# Patient Record
Sex: FEMALE | Race: BLACK OR AFRICAN AMERICAN | URBAN - METROPOLITAN AREA
[De-identification: names, ages, dates, MRNs, and addresses within clinical notes are randomized per-mention and may not be internally consistent; named-entity substitution may affect disease eponyms.]

---

## 2016-10-26 LAB
HBV SURFACE AG SERPL QL IA: NEGATIVE
HIV 1+2 AB+HIV1 P24 AG SERPL QL IA: NEGATIVE
RUBELLA ANTIBODY IGG QUANTITATIVE: NORMAL IU/ML
T PALLIDUM IGG SER QL: NORMAL

## 2017-05-03 LAB — GROUP B STREP PCR: NEGATIVE

## 2017-05-10 ENCOUNTER — HOSPITAL ENCOUNTER (OUTPATIENT)
Facility: CLINIC | Age: 27
Discharge: HOME | End: 2017-05-10
Attending: OBSTETRICS & GYNECOLOGY | Admitting: OBSTETRICS & GYNECOLOGY
Payer: COMMERCIAL

## 2017-05-10 VITALS
WEIGHT: 176 LBS | TEMPERATURE: 99 F | SYSTOLIC BLOOD PRESSURE: 114 MMHG | HEART RATE: 90 BPM | HEIGHT: 65 IN | DIASTOLIC BLOOD PRESSURE: 60 MMHG | RESPIRATION RATE: 18 BRPM | BODY MASS INDEX: 29.32 KG/M2

## 2017-05-10 PROCEDURE — 25000125 ZZHC RX 250: Performed by: OBSTETRICS & GYNECOLOGY

## 2017-05-10 PROCEDURE — 59025 FETAL NON-STRESS TEST: CPT

## 2017-05-10 PROCEDURE — 59412 ANTEPARTUM MANIPULATION: CPT

## 2017-05-10 PROCEDURE — 96372 THER/PROPH/DIAG INJ SC/IM: CPT

## 2017-05-10 RX ORDER — PRENATAL VIT/IRON FUM/FOLIC AC 27MG-0.8MG
1 TABLET ORAL DAILY
COMMUNITY

## 2017-05-10 RX ORDER — TERBUTALINE SULFATE 1 MG/ML
0.25 INJECTION, SOLUTION SUBCUTANEOUS ONCE
Status: DISCONTINUED | OUTPATIENT
Start: 2017-05-10 | End: 2017-05-10

## 2017-05-10 RX ORDER — LIDOCAINE 40 MG/G
CREAM TOPICAL
Status: DISCONTINUED | OUTPATIENT
Start: 2017-05-10 | End: 2017-05-10 | Stop reason: HOSPADM

## 2017-05-10 RX ORDER — TERBUTALINE SULFATE 1 MG/ML
0.25 INJECTION, SOLUTION SUBCUTANEOUS ONCE
Status: COMPLETED | OUTPATIENT
Start: 2017-05-10 | End: 2017-05-10

## 2017-05-10 RX ADMIN — TERBUTALINE SULFATE 0.25 MG: 1 INJECTION, SOLUTION SUBCUTANEOUS at 10:50

## 2017-05-10 NOTE — PROVIDER NOTIFICATION
05/10/17 1100   Provider Notification   Provider Name/Title Dr. Chen   Method of Notification At Bedside   Request Evaluate in Person   Dr. Chen at bedside. Bedside ultrasound demonstrates baby is in cephalic position. No attempt at external version necessary at this time.

## 2017-05-10 NOTE — PROVIDER NOTIFICATION
05/10/17 1042   Provider Notification   Provider Name/Title Dr. Chen   Method of Notification At Bedside   Request Evaluate in Person   Dr. Chen at bedside to evaluate patient and discuss plan of care. Patient and spouse questions answered and agreeable with plan, consent form signed.

## 2017-05-10 NOTE — DISCHARGE INSTRUCTIONS
Discharge Instruction for Undelivered Patients      You were seen for: Scheduled external version  We Consulted: Dr. Chen  You had (Test or Medicine): uterine and fetal monitoring, ultrasound, terbutaline     Diet:   Drink 8 to 12 glasses of liquids (milk, juice, water) every day.  You may eat meals and snacks.     Activity:  Call your doctor or nurse midwife if your baby is moving less than usual.     Call your provider if you notice:  Swelling in your face or increased swelling in your hands or legs.  Headaches that are not relieved by Tylenol (acetaminophen).  Changes in your vision (blurring: seeing spots or stars.)  Nausea (sick to your stomach) and vomiting (throwing up).   Weight gain of 5 pounds or more per week.  Heartburn that doesn't go away.  Signs of bladder infection: pain when you urinate (use the toilet), need to go more often and more urgently.  The bag of haskins (rupture of membranes) breaks, or you notice leaking in your underwear.  Bright red blood in your underwear.  Abdominal (lower belly) or stomach pain.  For first baby: Contractions (tightening) less than 5 minutes apart for one hour or more.  Increase or change in vaginal discharge (note the color and amount)    Follow-up:  As scheduled in the clinic

## 2017-05-10 NOTE — IP AVS SNAPSHOT
MRN:[id number]                      After Visit Summary   5/10/2017    Leonor Reynoso    MRN: [id number]           Thank you!     Thank you for choosing [facility] for your care. Our goal is always to provide you with excellent care. Hearing back from our patients is one way we can continue to improve our services. Please take a few minutes to complete the written survey that you may receive in the mail after you visit. If you would like to speak to someone directly about your visit please contact Patient Relations at [phone]. Thank you!          Patient Information     Date Of Birth          1990        About your hospital stay     You were admitted on:  May 10, 2017 You last received care in the:  [facility] Labor and Delivery    You were discharged on:  May 10, 2017       Who to Call     For medical emergencies, please call 911.  For non-urgent questions about your medical care, please call your primary care provider or clinic, [phone]          Attending Provider     Provider Specialty    Kun Kelly MD OB/Gyn    April, Neris Brady DO OB/Gyn       Primary Care Provider Office Phone # Fax #    [facility] [phone] [address] [place] [address]        After Care Instructions     Discharge Instructions - Post Extrenal Version Procedure           Discharge Instructions - Post Extrenal Version Procedure                 Further instructions from your care team       Discharge Instruction for Undelivered Patients      You were seen for: Scheduled external version  We Consulted: Dr. Chen  You had (Test or Medicine): uterine and fetal monitoring, ultrasound, terbutaline     Diet:   Drink 8 to 12 glasses of liquids (milk, juice, water) every day.  You may eat meals and snacks.     Activity:  Call your doctor or nurse midwife if your baby is moving less than usual.     Call your provider if you  "notice:  Swelling in your face or increased swelling in your hands or legs.  Headaches that are not relieved by Tylenol (acetaminophen).  Changes in your vision (blurring: seeing spots or stars.)  Nausea (sick to your stomach) and vomiting (throwing up).   Weight gain of 5 pounds or more per week.  Heartburn that doesn't go away.  Signs of bladder infection: pain when you urinate (use the toilet), need to go more often and more urgently.  The bag of haskins (rupture of membranes) breaks, or you notice leaking in your underwear.  Bright red blood in your underwear.  Abdominal (lower belly) or stomach pain.  For first baby: Contractions (tightening) less than 5 minutes apart for one hour or more.  Increase or change in vaginal discharge (note the color and amount)    Follow-up:  As scheduled in the clinic          Pending Results     No orders found from 2017 to 2017.            Admission Information     Date & Time Provider Department Dept. Phone    5/10/2017 eNris Chen,  [facility] Labor and Delivery [phone]      Your Vitals Were     Blood Pressure Pulse Temperature Respirations Height Weight    114/60 90 99  F (37.2  C) (Oral) 18 1.651 m (5' 5\") 79.8 kg (176 lb)    Last Period BMI (Body Mass Index)                2016 29.29 kg/m2          [employer] Better Information     [employer] lets you send messages to your doctor, view your test results, renew your prescriptions, schedule appointments and more. To sign up, go to [email or web address] . Click on \"Log in\" on the left side of the screen, which will take you to the Welcome page. Then click on \"Sign up Now\" on the right side of the page.     You will be asked to enter the access code listed below, as well as some personal information. Please follow the directions to create your username and password.     Your access code is: [email or web address]  Expires: 2017 11:19 AM     Your access code will  in 90 days. If you need help or a new code, " please call your [place] clinic or [phone].        Care EveryWhere ID     This is your Care EveryWhere ID. This could be used by other organizations to access your [place] medical records  [id number]           Review of your medicines      UNREVIEWED medicines. Ask your doctor about these medicines        Dose / Directions    prenatal multivitamin  plus iron 27-0.8 MG Tabs per tablet   Indication:  Pregnancy        Dose:  1 tablet   Take 1 tablet by mouth daily   Refills:  0                Protect others around you: Learn how to safely use, store and throw away your medicines at www.disposemymeds.org.             Medication List: This is a list of all your medications and when to take them. Check marks below indicate your daily home schedule. Keep this list as a reference.      Medications           Morning Afternoon Evening Bedtime As Needed    prenatal multivitamin  plus iron 27-0.8 MG Tabs per tablet   Take 1 tablet by mouth daily

## 2017-05-10 NOTE — H&P
"[facility] OB H&P    Leonor Reynoso  May 10, 2017    This is a 26 year old  at 37 0/7 who presents for ECV. Prenatal care at [facility] complicated by breech position, recent polyhydramnios with weekly BPP/NST's, resolved placental previa with shrinking 7 cm subchorionic hemorrhage on  US 3/2017 with recent growth OB US 2017 only mentioning posterior placenta.  Pt was seen in the office 2017 and management options reviewed including ECV or PLTCS.  After reviewing R/B/I/A, pt is interested in ECV and accepts the risks.      PAST MEDICAL HISTORY: History reviewed. No pertinent past medical history.    PAST SURGICAL HISTORY: History reviewed. No pertinent surgical history.    FAMILY HISTORY: No family history on file.    SOCIAL HISTORY:   Social History   Substance Use Topics     Smoking status: Never Smoker     Smokeless tobacco: Not on file     Alcohol use No       Physical Exam:  Vitals: /64  Pulse 90  Temp 99  F (37.2  C) (Oral)  Resp 18  Ht 1.651 m (5' 5\")  Wt 79.8 kg (176 lb)  LMP 2016  BMI 29.29 kg/m2  BMI= Body mass index is 29.29 kg/(m^2).  TOCO: rare  SVE: not done  FHT'S:130's, moderate variability, reactive prior to procedure    Assessment and Plan: IUP 37 0/7, Breech (now vertex), polyhydramnios, posterior placenta, Rh+    BSUS performed showing VERTEX position    1.  BPP less than 12 hours ago showed baby in breech position, now vertex.  This discussed with pt/  that baby now vertex.  Discussed that we would continue with her weekly BPP/ NST's for polyhydramnios and monitor fetal position.  Discussed possibility of flipping back to breech with the polyhydramnios but we would continue to monitor.  NST reactive, category 1.  2.  Keep next scheduled clinic visit.  Return precautions reviewed.    Neris Chen   "

## 2017-05-10 NOTE — IP AVS SNAPSHOT
[facility] Labor and Delivery    [address]    [facility] [address]    Phone:  [phone]    Fax:  [phone]                                       After Visit Summary   5/10/2017    Leonor Reynoso    MRN: [id number]           After Visit Summary Signature Page     I have received my discharge instructions, and my questions have been answered. I have discussed any challenges I see with this plan with the nurse or doctor.    ..........................................................................................................................................  Patient/Patient Representative Signature      ..........................................................................................................................................  Patient Representative Print Name and Relationship to Patient    ..................................................               ................................................  Date                                            Time    ..........................................................................................................................................  Reviewed by Signature/Title    ...................................................              ..............................................  Date                                                            Time

## 2017-05-10 NOTE — PLAN OF CARE
26 year old  at 37 weeks gestation presents to MAC #2 for external cephalic version. Patient reports good fetal movement, denies leaking of fluid, vaginal bleeding, and contractions. EFM and toco explained and applied. Health history obtained, physical assessment completed. Dr. Chen on unit and aware of patient arrival, FHTs and contraction pattern, orders received. PIV placed per orders and terbutaline administered. Will continue to monitor.

## 2017-05-10 NOTE — PLAN OF CARE
Data: Patient presented to [facility] for scheduled external version.  Cervical exam deferred.  Membranes intact.  Contractions occasionally per toco, patient reports not feeling contractions.  Action:  Presumed adequate fetal oxygenation documented (see flow record). Discharge instructions reviewed.  Patient instructed to report change in fetal movement, vaginal leaking of fluid or bleeding, abdominal pain, or any concerns related to the pregnancy to her nurse/physician.   Response: Orders to discharge home per Dr. Chen.  Patient verbalized understanding of education and verbalized agreement with plan.

## 2017-06-07 ENCOUNTER — HOSPITAL ENCOUNTER (INPATIENT)
Facility: CLINIC | Age: 27
LOS: 4 days | Discharge: HOME | End: 2017-06-11
Attending: OBSTETRICS & GYNECOLOGY | Admitting: OBSTETRICS & GYNECOLOGY
Payer: COMMERCIAL

## 2017-06-07 DIAGNOSIS — Z98.891 S/P CESAREAN SECTION: Primary | ICD-10-CM

## 2017-06-07 LAB
ABO + RH BLD: NORMAL
ABO + RH BLD: NORMAL
BLD GP AB SCN SERPL QL: NORMAL
BLOOD BANK CMNT PATIENT-IMP: NORMAL
HGB BLD-MCNC: 12.7 G/DL (ref 11.7–15.7)
SPECIMEN EXP DATE BLD: NORMAL

## 2017-06-07 PROCEDURE — 86850 RBC ANTIBODY SCREEN: CPT | Performed by: OBSTETRICS & GYNECOLOGY

## 2017-06-07 PROCEDURE — 85018 HEMOGLOBIN: CPT | Performed by: OBSTETRICS & GYNECOLOGY

## 2017-06-07 PROCEDURE — 3E0P7GC INTRODUCTION OF OTHER THERAPEUTIC SUBSTANCE INTO FEMALE REPRODUCTIVE, VIA NATURAL OR ARTIFICIAL OPENING: ICD-10-PCS | Performed by: OBSTETRICS & GYNECOLOGY

## 2017-06-07 PROCEDURE — 86901 BLOOD TYPING SEROLOGIC RH(D): CPT | Performed by: OBSTETRICS & GYNECOLOGY

## 2017-06-07 PROCEDURE — 25000132 ZZH RX MED GY IP 250 OP 250 PS 637: Performed by: OBSTETRICS & GYNECOLOGY

## 2017-06-07 PROCEDURE — 12000031 ZZH R&B OB CRITICAL

## 2017-06-07 PROCEDURE — 86900 BLOOD TYPING SEROLOGIC ABO: CPT | Performed by: OBSTETRICS & GYNECOLOGY

## 2017-06-07 PROCEDURE — 86780 TREPONEMA PALLIDUM: CPT | Performed by: OBSTETRICS & GYNECOLOGY

## 2017-06-07 RX ADMIN — DINOPROSTONE 10 MG: 10 INSERT VAGINAL at 20:51

## 2017-06-07 NOTE — IP AVS SNAPSHOT
MRN:[id number]                      After Visit Summary   2017    Leonor Reynoso    MRN: [id number]           Thank you!     Thank you for choosing [facility] for your care. Our goal is always to provide you with excellent care. Hearing back from our patients is one way we can continue to improve our services. Please take a few minutes to complete the written survey that you may receive in the mail after you visit. If you would like to speak to someone directly about your visit please contact Patient Relations at [phone]. Thank you!          Patient Information     Date Of Birth          1990        Designated Caregiver       Most Recent Value    Caregiver    Will someone help with your care after discharge? no      About your hospital stay     You were admitted on:  2017 You last received care in the:  [facility] Postpartum    You were discharged on:  2017       Who to Call     For medical emergencies, please call 911.  For non-urgent questions about your medical care, please call your primary care provider or clinic, [phone]  For questions related to your surgery, please call your surgery clinic        Attending Provider     Provider Specialty    Jael Reeves MD OB/Gyn    Dane Engel MD OB/Gyn       Primary Care Provider Office Phone # Fax #    [facility] [phone]      After Care Instructions     Activity       Review discharge instructions            Diet       Resume previous diet            Discharge Instructions - Postpartum visit       Schedule postpartum visit with your provider and return to clinic in 6 weeks.                  Further instructions from your care team         Discharge Instructions for  Section ()  Lactation Phone # [phone]  You had a  section, or . During the , your baby was delivered through a surgical incision  in your stomach and uterus. Full recovery after a  can take time. It s important to take care of yourself -- for your own sake and because your new baby needs you. Here are some guidelines to follow at home.  Incision care  Here's how to take care of your incision:    Shower as needed. Pat your incision dry.    Watch your incision for signs of infection, like increasing redness or drainage.    Hold a pillow against the incision when you laugh or cough and when you get up from a lying or sitting position.    Remember, it can take as long as 6 weeks for a  incision to heal.  Activity  Here are some suggestions:    Don t try to take care of anyone other than your baby and yourself.    Remember, the more active you are, the more likely you are to have an increase in your bleeding.    Get lots of rest. Take naps in the afternoon.    Increase your activities gradually.    Plan your activities so that you don t have to go up or down stairs more than necessary.    Do postsurgical deep breathing and coughing exercises. Ask your healthcare provider for instructions.    Don t lift anything heavier than your baby until your healthcare provider tells you it s OK.    Don t drive until your healthcare provider says it s OK.    Don t have sexual intercourse until after you ve had a follow-up appointment with your healthcare provider and you have decided on a birth control method.  Follow-up  Make a follow-up appointment as directed by our staff.  When to call your healthcare provider  Call your healthcare provider right away if you have any of the following:    Fever of 100.4 F (38 C) or higher    Redness, pain, or drainage at your incision site    Bleeding that requires a new sanitary pad every hour    Severe pain in the abdomen    Pain or urgency with urination    Foul odor from vaginal discharge    Trouble urinating or emptying your bladder    No bowel movement within 1 week after the birth of your  "baby    Swollen, red, painful area in the leg    Appearance of rash or hives    Sore, red, painful area on the breasts that may be accompanied by flu-like symptoms    Feelings of anxiety, panic, and/or depression     5986-8961 [employer]. [address] [place] [address]. All rights reserved. This information is not intended as a substitute for professional medical care. Always follow your healthcare professional's instructions.          Pending Results     No orders found from 2017 to 2017.            Statement of Approval     Ordered          17 1109  I have reviewed and agree with all the recommendations and orders detailed in this document.  EFFECTIVE NOW     Approved and electronically signed by:  Kun Kelly MD             Admission Information     Date & Time Provider Department Dept. Phone    2017 Dane Engel MD [place] [phone]      Your Vitals Were     Blood Pressure Temperature Respirations Weight Last Period Pulse Oximetry    124/70 99.1  F (37.3  C) (Oral) 16 86.2 kg (190 lb) 2016 98%    BMI (Body Mass Index)                   31.62 kg/m2           MyChart Information     [employer] lets you send messages to your doctor, view your test results, renew your prescriptions, schedule appointments and more. To sign up, go to [email or web address] . Click on \"Log in\" on the left side of the screen, which will take you to the Welcome page. Then click on \"Sign up Now\" on the right side of the page.     You will be asked to enter the access code listed below, as well as some personal information. Please follow the directions to create your username and password.     Your access code is: [email or web address]  Expires: 2017 11:19 AM     Your access code will  in 90 days. If you need help or a new code, please call your [facility] or [phone].        Care EveryWhere ID     This is your Care EveryWhere ID. This " could be used by other organizations to access your [facility] medical records  [id number]           Review of your medicines      START taking        Dose / Directions    acetaminophen 325 MG tablet   Commonly known as:  TYLENOL        Dose:  650 mg   Take 2 tablets (650 mg) by mouth every 4 hours as needed for mild pain   Quantity:  100 tablet   Refills:  0       breast pump Misc        Dose:  1 each   1 each as needed   Quantity:  1 each   Refills:  0       ibuprofen 600 MG tablet   Commonly known as:  ADVIL/MOTRIN        Dose:  600 mg   Take 1 tablet (600 mg) by mouth every 6 hours as needed for moderate pain   Quantity:  60 tablet   Refills:  0       oxyCODONE 5 MG IR tablet   Commonly known as:  ROXICODONE        Dose:  5 mg   Take 1 tablet (5 mg) by mouth every 4 hours as needed for pain maximum 8 tablet(s) per day   Quantity:  20 tablet   Refills:  0         CONTINUE these medicines which have NOT CHANGED        Dose / Directions    prenatal multivitamin  plus iron 27-0.8 MG Tabs per tablet   Indication:  Pregnancy        Dose:  1 tablet   Take 1 tablet by mouth daily   Refills:  0            Where to get your medicines      Some of these will need a paper prescription and others can be bought over the counter. Ask your nurse if you have questions.     Bring a paper prescription for each of these medications     acetaminophen 325 MG tablet    breast pump Misc    ibuprofen 600 MG tablet    oxyCODONE 5 MG IR tablet                Protect others around you: Learn how to safely use, store and throw away your medicines at www.disposemymeds.org.             Medication List: This is a list of all your medications and when to take them. Check marks below indicate your daily home schedule. Keep this list as a reference.      Medications           Morning Afternoon Evening Bedtime As Needed    acetaminophen 325 MG tablet   Commonly known as:  TYLENOL   Take 2 tablets (650 mg) by mouth every 4 hours as needed for mild  pain   Last time this was given:  975 mg on 6/11/2017  5:46 AM                                breast pump Misc   1 each as needed                                ibuprofen 600 MG tablet   Commonly known as:  ADVIL/MOTRIN   Take 1 tablet (600 mg) by mouth every 6 hours as needed for moderate pain   Last time this was given:  800 mg on 6/10/2017 10:59 PM                                oxyCODONE 5 MG IR tablet   Commonly known as:  ROXICODONE   Take 1 tablet (5 mg) by mouth every 4 hours as needed for pain maximum 8 tablet(s) per day   Last time this was given:  5 mg on 6/10/2017  7:15 PM                                prenatal multivitamin  plus iron 27-0.8 MG Tabs per tablet   Take 1 tablet by mouth daily   Last time this was given:  1 tablet on 6/11/2017  9:38 AM

## 2017-06-07 NOTE — LETTER
[facility] Postpartum Home Care Referral  [facility] POSTPARTUM  [address] [place] [address]  Phone: [phone]  Fax: [phone]    Date of Referral: 2017    Leonor Cuenca MRN# [id number]   Age: 26 year old YOB: 1990           Date of Admission:  2017  7:32 PM    Primary care provider: [facility], [place]  Attending Provider: Dane Engel MD    Payor: BCBS / Plan: [place] [place] / Product Type: Indemnity /          Pregnancy History:   The details of the mother's pregnancy are as follows:  OBSTETRIC HISTORY:  @[age@  EDC: Estimated Date of Delivery: 17  Obstetric History       T1      L1     SAB0   TAB0   Ectopic0   Multiple0   Live Births1       # Outcome Date GA Lbr Lobito/2nd Weight Sex Delivery Anes PTL Lv   2 Term 17 41w1d  4.44 kg (9 lb 12.6 oz) M CS-LTranv EPI N STEPHEN      Name: ERVIN CUENCA      Complications: Fetal Intolerance,Failure to Progress in First Stage      Apgar1:  8                Apgar5: 9   1 SAB 2016     SAB             Prenatal Labs:   Lab Results   Component Value Date    ABO O 2017    RH  Pos 2017    AS Neg 2017    HEPBANG negative 10/26/2016    CHPCRT  05/10/2016     Negative   Negative for C. trachomatis rRNA by transcription mediated amplification.   A negative result by transcription mediated amplification does not preclude the   presence of C. trachomatis infection because results are dependent on proper   and adequate collection, absence of inhibitors, and sufficient rRNA to be   detected.      GCPCRT  05/10/2016     Negative   Negative for N. gonorrhoeae rRNA by transcription mediated amplification.   A negative result by transcription mediated amplification does not preclude the   presence of N. gonorrhoeae infection because results are dependent on proper   and adequate collection, absence of inhibitors, and sufficient rRNA to be   detected.       "TREPAB Negative 2017    HGB 11.0 (L) 2017       GBS Status:  Lab Results   Component Value Date    GBS negative 2017              Maternal History:   { :214490716}                      Family History:   { :993271837}          Social History:   { :8147177697}       Birth  History:      Birth Information  Information for the patient's :  Donna Reynoso Eman [[id number]]     Birth History     Birth     Length: 0.546 m (1' 9.5\")     Weight: 4.44 kg (9 lb 12.6 oz)     HC 36.8 cm     Apgar     One: 8     Five: 9     Delivery Method: , Low Transverse     Gestation Age: 41 1/7 wks       Information for the patient's :  Donna Reynoso Eman [[id number]]     Immunization History   Administered Date(s) Administered     Hepatitis B 2017             Information     Feeding plan:   Information for the patient's :  Donna Reynoso Eman [[id number]]           Latch:   Information for the patient's :  Donna Reynoso Eman [[id number]]   7      Information for the patient's :  Donna Reynoso Eman [[id number]]   Vitals  Pulse: 152  Heart Sounds: no murmur detected  Cardiac Regularity: Regular  Resp: 44  Temp: 98.5  F (36.9  C)  Temp src: Axillary      Information for the patient's :  Donna Reynoso Eman [[id number]]         Information for the patient's :  Donna Reynoso Eman [[id number]]   Weight: 4.196 kg (9 lb 4 oz)     Information for the patient's :  Donna Reynoso Eman [[id number]]   Percent Weight Change Since Birth: -5.5       Information for the patient's :  Donna Reynoso Eman [[id number]]   Hearing Screen Date: 06/10/17    Information for the patient's :  Donna Reynoso Eman [[id number]]          Bilirubin Results:     Information for the patient's :  Donna Reynoso Eman [[id number]]     Recent Labs   Lab Test  17   1445   TCBIL  2.0            Discharge Meds:      " Leonor Reynoso   Home Medication Instructions VALERIE:[id number]    Printed on:17 1121   Medication Information                      acetaminophen (TYLENOL) 325 MG tablet  Take 2 tablets (650 mg) by mouth every 4 hours as needed for mild pain             ibuprofen (ADVIL/MOTRIN) 600 MG tablet  Take 1 tablet (600 mg) by mouth every 6 hours as needed for moderate pain             Misc. Devices (BREAST PUMP) MISC  1 each as needed             oxyCODONE (ROXICODONE) 5 MG IR tablet  Take 1 tablet (5 mg) by mouth every 4 hours as needed for pain maximum 8 tablet(s) per day             Prenatal Vit-Fe Fumarate-FA (PRENATAL MULTIVITAMIN  PLUS IRON) 27-0.8 MG TABS per tablet  Take 1 tablet by mouth daily                 Information for the patient's :  Donna Reynoso [[id number]]     There are no discharge medications for this patient.           Summary of Plan of Care:     Home Care to draw [place] Screen? {YES LAB SLIP SENT HOME; NO:[id number]}    Home Care Agency referred to: ***    ***      Ada Smith RN

## 2017-06-07 NOTE — IP AVS SNAPSHOT
[facility]    [address] [facility] [address]    Phone:  [phone]    Fax:  [phone]                                       After Visit Summary   6/7/2017    Leonor Reynoso    MRN: [id number]           After Visit Summary Signature Page     I have received my discharge instructions, and my questions have been answered. I have discussed any challenges I see with this plan with the nurse or doctor.    ..........................................................................................................................................  Patient/Patient Representative Signature      ..........................................................................................................................................  Patient Representative Print Name and Relationship to Patient    ..................................................               ................................................  Date                                            Time    ..........................................................................................................................................  Reviewed by Signature/Title    ...................................................              ..............................................  Date                                                            Time

## 2017-06-08 ENCOUNTER — ANESTHESIA (OUTPATIENT)
Dept: OBGYN | Facility: CLINIC | Age: 27
End: 2017-06-08
Payer: COMMERCIAL

## 2017-06-08 ENCOUNTER — ANESTHESIA EVENT (OUTPATIENT)
Dept: OBGYN | Facility: CLINIC | Age: 27
End: 2017-06-08
Payer: COMMERCIAL

## 2017-06-08 ENCOUNTER — SURGERY (OUTPATIENT)
Age: 27
End: 2017-06-08

## 2017-06-08 PROBLEM — Z98.891 S/P CESAREAN SECTION: Status: ACTIVE | Noted: 2017-06-08

## 2017-06-08 LAB — T PALLIDUM IGG+IGM SER QL: NEGATIVE

## 2017-06-08 PROCEDURE — 40000671 ZZH STATISTIC ANESTHESIA CASE

## 2017-06-08 PROCEDURE — 36000058 ZZH SURGERY LEVEL 3 EA 15 ADDTL MIN: Performed by: OBSTETRICS & GYNECOLOGY

## 2017-06-08 PROCEDURE — 27210995 ZZH RX 272: Performed by: OBSTETRICS & GYNECOLOGY

## 2017-06-08 PROCEDURE — 37000011 ZZH ANESTHESIA WARD SERVICE

## 2017-06-08 PROCEDURE — 25000132 ZZH RX MED GY IP 250 OP 250 PS 637

## 2017-06-08 PROCEDURE — 25000128 H RX IP 250 OP 636

## 2017-06-08 PROCEDURE — 71000012 ZZH RECOVERY PHASE 1 LEVEL 1 FIRST HR: Performed by: OBSTETRICS & GYNECOLOGY

## 2017-06-08 PROCEDURE — 25000125 ZZHC RX 250

## 2017-06-08 PROCEDURE — 71000013 ZZH RECOVERY PHASE 1 LEVEL 1 EA ADDTL HR: Performed by: OBSTETRICS & GYNECOLOGY

## 2017-06-08 PROCEDURE — 10907ZC DRAINAGE OF AMNIOTIC FLUID, THERAPEUTIC FROM PRODUCTS OF CONCEPTION, VIA NATURAL OR ARTIFICIAL OPENING: ICD-10-PCS | Performed by: OBSTETRICS & GYNECOLOGY

## 2017-06-08 PROCEDURE — 27210794 ZZH OR GENERAL SUPPLY STERILE: Performed by: OBSTETRICS & GYNECOLOGY

## 2017-06-08 PROCEDURE — 25000132 ZZH RX MED GY IP 250 OP 250 PS 637: Performed by: OBSTETRICS & GYNECOLOGY

## 2017-06-08 PROCEDURE — 25000125 ZZHC RX 250: Performed by: ANESTHESIOLOGY

## 2017-06-08 PROCEDURE — 36000056 ZZH SURGERY LEVEL 3 1ST 30 MIN: Performed by: OBSTETRICS & GYNECOLOGY

## 2017-06-08 PROCEDURE — 37000009 ZZH ANESTHESIA TECHNICAL FEE, EACH ADDTL 15 MIN: Performed by: OBSTETRICS & GYNECOLOGY

## 2017-06-08 PROCEDURE — 25000125 ZZHC RX 250: Performed by: OBSTETRICS & GYNECOLOGY

## 2017-06-08 PROCEDURE — 12000029 ZZH R&B OB INTERMEDIATE

## 2017-06-08 PROCEDURE — 3E0R3CZ INTRODUCTION OF REGIONAL ANESTHETIC INTO SPINAL CANAL, PERCUTANEOUS APPROACH: ICD-10-PCS | Performed by: ANESTHESIOLOGY

## 2017-06-08 PROCEDURE — 25000128 H RX IP 250 OP 636: Performed by: OBSTETRICS & GYNECOLOGY

## 2017-06-08 PROCEDURE — 00HU33Z INSERTION OF INFUSION DEVICE INTO SPINAL CANAL, PERCUTANEOUS APPROACH: ICD-10-PCS | Performed by: ANESTHESIOLOGY

## 2017-06-08 PROCEDURE — 25000128 H RX IP 250 OP 636: Performed by: NURSE ANESTHETIST, CERTIFIED REGISTERED

## 2017-06-08 PROCEDURE — 40000275 ZZH STATISTIC RCP TIME EA 10 MIN

## 2017-06-08 PROCEDURE — 37000008 ZZH ANESTHESIA TECHNICAL FEE, 1ST 30 MIN: Performed by: OBSTETRICS & GYNECOLOGY

## 2017-06-08 PROCEDURE — 25000128 H RX IP 250 OP 636: Performed by: ANESTHESIOLOGY

## 2017-06-08 PROCEDURE — 25000125 ZZHC RX 250: Performed by: NURSE ANESTHETIST, CERTIFIED REGISTERED

## 2017-06-08 PROCEDURE — 40000977 ZZH STATISTIC ATTENDANCE AT DELIVERY

## 2017-06-08 RX ORDER — MORPHINE SULFATE 1 MG/ML
INJECTION, SOLUTION EPIDURAL; INTRATHECAL; INTRAVENOUS PRN
Status: DISCONTINUED | OUTPATIENT
Start: 2017-06-08 | End: 2017-06-08

## 2017-06-08 RX ORDER — OXYTOCIN 10 [USP'U]/ML
10 INJECTION, SOLUTION INTRAMUSCULAR; INTRAVENOUS
Status: DISCONTINUED | OUTPATIENT
Start: 2017-06-08 | End: 2017-06-11 | Stop reason: HOSPADM

## 2017-06-08 RX ORDER — DEXTROSE, SODIUM CHLORIDE, SODIUM LACTATE, POTASSIUM CHLORIDE, AND CALCIUM CHLORIDE 5; .6; .31; .03; .02 G/100ML; G/100ML; G/100ML; G/100ML; G/100ML
INJECTION, SOLUTION INTRAVENOUS CONTINUOUS
Status: DISCONTINUED | OUTPATIENT
Start: 2017-06-08 | End: 2017-06-11 | Stop reason: HOSPADM

## 2017-06-08 RX ORDER — EPHEDRINE SULFATE 50 MG/ML
5 INJECTION, SOLUTION INTRAMUSCULAR; INTRAVENOUS; SUBCUTANEOUS
Status: DISCONTINUED | OUTPATIENT
Start: 2017-06-08 | End: 2017-06-08

## 2017-06-08 RX ORDER — LIDOCAINE HCL/EPINEPHRINE/PF 2%-1:200K
VIAL (ML) INJECTION PRN
Status: DISCONTINUED | OUTPATIENT
Start: 2017-06-08 | End: 2017-06-08

## 2017-06-08 RX ORDER — ONDANSETRON 2 MG/ML
4 INJECTION INTRAMUSCULAR; INTRAVENOUS EVERY 6 HOURS PRN
Status: DISCONTINUED | OUTPATIENT
Start: 2017-06-08 | End: 2017-06-11 | Stop reason: HOSPADM

## 2017-06-08 RX ORDER — LIDOCAINE 40 MG/G
CREAM TOPICAL
Status: DISCONTINUED | OUTPATIENT
Start: 2017-06-08 | End: 2017-06-11 | Stop reason: HOSPADM

## 2017-06-08 RX ORDER — SIMETHICONE 80 MG
80 TABLET,CHEWABLE ORAL 4 TIMES DAILY PRN
Status: DISCONTINUED | OUTPATIENT
Start: 2017-06-08 | End: 2017-06-11 | Stop reason: HOSPADM

## 2017-06-08 RX ORDER — DEXAMETHASONE SODIUM PHOSPHATE 4 MG/ML
INJECTION, SOLUTION INTRA-ARTICULAR; INTRALESIONAL; INTRAMUSCULAR; INTRAVENOUS; SOFT TISSUE PRN
Status: DISCONTINUED | OUTPATIENT
Start: 2017-06-08 | End: 2017-06-08

## 2017-06-08 RX ORDER — OXYTOCIN/0.9 % SODIUM CHLORIDE 30/500 ML
100 PLASTIC BAG, INJECTION (ML) INTRAVENOUS CONTINUOUS
Status: DISCONTINUED | OUTPATIENT
Start: 2017-06-08 | End: 2017-06-11 | Stop reason: HOSPADM

## 2017-06-08 RX ORDER — PROCHLORPERAZINE 25 MG
25 SUPPOSITORY, RECTAL RECTAL EVERY 12 HOURS PRN
Status: DISCONTINUED | OUTPATIENT
Start: 2017-06-08 | End: 2017-06-11 | Stop reason: HOSPADM

## 2017-06-08 RX ORDER — SODIUM CHLORIDE, SODIUM LACTATE, POTASSIUM CHLORIDE, CALCIUM CHLORIDE 600; 310; 30; 20 MG/100ML; MG/100ML; MG/100ML; MG/100ML
INJECTION, SOLUTION INTRAVENOUS CONTINUOUS
Status: DISCONTINUED | OUTPATIENT
Start: 2017-06-08 | End: 2017-06-08

## 2017-06-08 RX ORDER — ONDANSETRON 4 MG/1
4 TABLET, ORALLY DISINTEGRATING ORAL EVERY 6 HOURS PRN
Status: DISCONTINUED | OUTPATIENT
Start: 2017-06-08 | End: 2017-06-08

## 2017-06-08 RX ORDER — ONDANSETRON 2 MG/ML
4 INJECTION INTRAMUSCULAR; INTRAVENOUS EVERY 6 HOURS PRN
Status: DISCONTINUED | OUTPATIENT
Start: 2017-06-08 | End: 2017-06-08

## 2017-06-08 RX ORDER — CARBOPROST TROMETHAMINE 250 UG/ML
250 INJECTION, SOLUTION INTRAMUSCULAR
Status: DISCONTINUED | OUTPATIENT
Start: 2017-06-08 | End: 2017-06-11 | Stop reason: HOSPADM

## 2017-06-08 RX ORDER — EPHEDRINE SULFATE 50 MG/ML
INJECTION, SOLUTION INTRAMUSCULAR; INTRAVENOUS; SUBCUTANEOUS
Status: COMPLETED
Start: 2017-06-08 | End: 2017-06-08

## 2017-06-08 RX ORDER — ACETAMINOPHEN 325 MG/1
650 TABLET ORAL EVERY 4 HOURS PRN
Status: DISCONTINUED | OUTPATIENT
Start: 2017-06-11 | End: 2017-06-11 | Stop reason: HOSPADM

## 2017-06-08 RX ORDER — NALOXONE HYDROCHLORIDE 0.4 MG/ML
.1-.4 INJECTION, SOLUTION INTRAMUSCULAR; INTRAVENOUS; SUBCUTANEOUS
Status: DISCONTINUED | OUTPATIENT
Start: 2017-06-08 | End: 2017-06-08

## 2017-06-08 RX ORDER — BISACODYL 10 MG
10 SUPPOSITORY, RECTAL RECTAL DAILY PRN
Status: DISCONTINUED | OUTPATIENT
Start: 2017-06-10 | End: 2017-06-11 | Stop reason: HOSPADM

## 2017-06-08 RX ORDER — CEFAZOLIN SODIUM 2 G/100ML
2 INJECTION, SOLUTION INTRAVENOUS
Status: COMPLETED | OUTPATIENT
Start: 2017-06-08 | End: 2017-06-08

## 2017-06-08 RX ORDER — METOCLOPRAMIDE HYDROCHLORIDE 5 MG/ML
10 INJECTION INTRAMUSCULAR; INTRAVENOUS EVERY 6 HOURS PRN
Status: DISCONTINUED | OUTPATIENT
Start: 2017-06-08 | End: 2017-06-11 | Stop reason: HOSPADM

## 2017-06-08 RX ORDER — LANOLIN 100 %
OINTMENT (GRAM) TOPICAL
Status: DISCONTINUED | OUTPATIENT
Start: 2017-06-08 | End: 2017-06-11 | Stop reason: HOSPADM

## 2017-06-08 RX ORDER — METHYLERGONOVINE MALEATE 0.2 MG/ML
200 INJECTION INTRAVENOUS
Status: DISCONTINUED | OUTPATIENT
Start: 2017-06-08 | End: 2017-06-08

## 2017-06-08 RX ORDER — PRENATAL VIT/IRON FUM/FOLIC AC 27MG-0.8MG
1 TABLET ORAL DAILY
Status: DISCONTINUED | OUTPATIENT
Start: 2017-06-08 | End: 2017-06-11 | Stop reason: HOSPADM

## 2017-06-08 RX ORDER — NALBUPHINE HYDROCHLORIDE 10 MG/ML
2.5-5 INJECTION, SOLUTION INTRAMUSCULAR; INTRAVENOUS; SUBCUTANEOUS EVERY 6 HOURS PRN
Status: DISCONTINUED | OUTPATIENT
Start: 2017-06-08 | End: 2017-06-08

## 2017-06-08 RX ORDER — HYDROMORPHONE HYDROCHLORIDE 1 MG/ML
.3-.5 INJECTION, SOLUTION INTRAMUSCULAR; INTRAVENOUS; SUBCUTANEOUS EVERY 30 MIN PRN
Status: DISCONTINUED | OUTPATIENT
Start: 2017-06-08 | End: 2017-06-11 | Stop reason: HOSPADM

## 2017-06-08 RX ORDER — OXYTOCIN/0.9 % SODIUM CHLORIDE 30/500 ML
340 PLASTIC BAG, INJECTION (ML) INTRAVENOUS CONTINUOUS PRN
Status: DISCONTINUED | OUTPATIENT
Start: 2017-06-08 | End: 2017-06-11 | Stop reason: HOSPADM

## 2017-06-08 RX ORDER — ACETAMINOPHEN 325 MG/1
650 TABLET ORAL EVERY 4 HOURS PRN
Status: DISCONTINUED | OUTPATIENT
Start: 2017-06-08 | End: 2017-06-08

## 2017-06-08 RX ORDER — DIPHENHYDRAMINE HCL 25 MG
25 CAPSULE ORAL EVERY 6 HOURS PRN
Status: DISCONTINUED | OUTPATIENT
Start: 2017-06-08 | End: 2017-06-11 | Stop reason: HOSPADM

## 2017-06-08 RX ORDER — CARBOPROST TROMETHAMINE 250 UG/ML
250 INJECTION, SOLUTION INTRAMUSCULAR
Status: DISCONTINUED | OUTPATIENT
Start: 2017-06-08 | End: 2017-06-08

## 2017-06-08 RX ORDER — METHYLERGONOVINE MALEATE 0.2 MG/ML
200 INJECTION INTRAVENOUS
Status: DISCONTINUED | OUTPATIENT
Start: 2017-06-08 | End: 2017-06-11 | Stop reason: HOSPADM

## 2017-06-08 RX ORDER — OXYTOCIN/0.9 % SODIUM CHLORIDE 30/500 ML
PLASTIC BAG, INJECTION (ML) INTRAVENOUS PRN
Status: DISCONTINUED | OUTPATIENT
Start: 2017-06-08 | End: 2017-06-08

## 2017-06-08 RX ORDER — DIPHENHYDRAMINE HYDROCHLORIDE 50 MG/ML
25 INJECTION INTRAMUSCULAR; INTRAVENOUS EVERY 6 HOURS PRN
Status: DISCONTINUED | OUTPATIENT
Start: 2017-06-08 | End: 2017-06-11 | Stop reason: HOSPADM

## 2017-06-08 RX ORDER — OXYCODONE AND ACETAMINOPHEN 5; 325 MG/1; MG/1
1 TABLET ORAL
Status: DISCONTINUED | OUTPATIENT
Start: 2017-06-08 | End: 2017-06-08

## 2017-06-08 RX ORDER — ZOLPIDEM TARTRATE 5 MG/1
5 TABLET ORAL
Status: DISCONTINUED | OUTPATIENT
Start: 2017-06-08 | End: 2017-06-08

## 2017-06-08 RX ORDER — FENTANYL CITRATE 50 UG/ML
100 INJECTION, SOLUTION INTRAMUSCULAR; INTRAVENOUS
Status: DISCONTINUED | OUTPATIENT
Start: 2017-06-08 | End: 2017-06-08

## 2017-06-08 RX ORDER — FENTANYL CITRATE 50 UG/ML
INJECTION, SOLUTION INTRAMUSCULAR; INTRAVENOUS PRN
Status: DISCONTINUED | OUTPATIENT
Start: 2017-06-08 | End: 2017-06-08

## 2017-06-08 RX ORDER — OXYCODONE HYDROCHLORIDE 5 MG/1
5-10 TABLET ORAL
Status: DISCONTINUED | OUTPATIENT
Start: 2017-06-08 | End: 2017-06-11 | Stop reason: HOSPADM

## 2017-06-08 RX ORDER — CEFAZOLIN SODIUM 1 G/3ML
1 INJECTION, POWDER, FOR SOLUTION INTRAMUSCULAR; INTRAVENOUS
Status: DISCONTINUED | OUTPATIENT
Start: 2017-06-08 | End: 2017-06-08

## 2017-06-08 RX ORDER — HYDROCORTISONE 2.5 %
CREAM (GRAM) TOPICAL 3 TIMES DAILY PRN
Status: DISCONTINUED | OUTPATIENT
Start: 2017-06-08 | End: 2017-06-11 | Stop reason: HOSPADM

## 2017-06-08 RX ORDER — MISOPROSTOL 100 UG/1
25 TABLET ORAL
Status: DISCONTINUED | OUTPATIENT
Start: 2017-06-08 | End: 2017-06-08

## 2017-06-08 RX ORDER — AMOXICILLIN 250 MG
1-2 CAPSULE ORAL 2 TIMES DAILY
Status: DISCONTINUED | OUTPATIENT
Start: 2017-06-08 | End: 2017-06-11 | Stop reason: HOSPADM

## 2017-06-08 RX ORDER — OXYTOCIN 10 [USP'U]/ML
10 INJECTION, SOLUTION INTRAMUSCULAR; INTRAVENOUS
Status: DISCONTINUED | OUTPATIENT
Start: 2017-06-08 | End: 2017-06-08

## 2017-06-08 RX ORDER — ACETAMINOPHEN 325 MG/1
975 TABLET ORAL EVERY 8 HOURS
Status: DISPENSED | OUTPATIENT
Start: 2017-06-08 | End: 2017-06-11

## 2017-06-08 RX ORDER — MAGNESIUM HYDROXIDE 1200 MG/15ML
LIQUID ORAL PRN
Status: DISCONTINUED | OUTPATIENT
Start: 2017-06-08 | End: 2017-06-08

## 2017-06-08 RX ORDER — IBUPROFEN 800 MG/1
800 TABLET, FILM COATED ORAL
Status: DISCONTINUED | OUTPATIENT
Start: 2017-06-08 | End: 2017-06-08

## 2017-06-08 RX ORDER — OXYTOCIN/0.9 % SODIUM CHLORIDE 30/500 ML
1-24 PLASTIC BAG, INJECTION (ML) INTRAVENOUS CONTINUOUS
Status: DISCONTINUED | OUTPATIENT
Start: 2017-06-08 | End: 2017-06-08

## 2017-06-08 RX ORDER — MISOPROSTOL 200 UG/1
800 TABLET ORAL
Status: DISCONTINUED | OUTPATIENT
Start: 2017-06-08 | End: 2017-06-11 | Stop reason: HOSPADM

## 2017-06-08 RX ORDER — KETOROLAC TROMETHAMINE 30 MG/ML
30 INJECTION, SOLUTION INTRAMUSCULAR; INTRAVENOUS EVERY 6 HOURS
Status: COMPLETED | OUTPATIENT
Start: 2017-06-08 | End: 2017-06-09

## 2017-06-08 RX ORDER — NALOXONE HYDROCHLORIDE 0.4 MG/ML
.1-.4 INJECTION, SOLUTION INTRAMUSCULAR; INTRAVENOUS; SUBCUTANEOUS
Status: DISCONTINUED | OUTPATIENT
Start: 2017-06-08 | End: 2017-06-11 | Stop reason: HOSPADM

## 2017-06-08 RX ORDER — OXYTOCIN/0.9 % SODIUM CHLORIDE 30/500 ML
100-340 PLASTIC BAG, INJECTION (ML) INTRAVENOUS CONTINUOUS PRN
Status: DISCONTINUED | OUTPATIENT
Start: 2017-06-08 | End: 2017-06-08

## 2017-06-08 RX ORDER — LIDOCAINE HYDROCHLORIDE AND EPINEPHRINE 15; 5 MG/ML; UG/ML
3 INJECTION, SOLUTION EPIDURAL
Status: DISCONTINUED | OUTPATIENT
Start: 2017-06-08 | End: 2017-06-08

## 2017-06-08 RX ORDER — CITRIC ACID/SODIUM CITRATE 334-500MG
30 SOLUTION, ORAL ORAL
Status: DISCONTINUED | OUTPATIENT
Start: 2017-06-08 | End: 2017-06-08

## 2017-06-08 RX ORDER — IBUPROFEN 400 MG/1
400-800 TABLET, FILM COATED ORAL EVERY 6 HOURS PRN
Status: DISCONTINUED | OUTPATIENT
Start: 2017-06-09 | End: 2017-06-11 | Stop reason: HOSPADM

## 2017-06-08 RX ORDER — CITRIC ACID/SODIUM CITRATE 334-500MG
SOLUTION, ORAL ORAL
Status: COMPLETED
Start: 2017-06-08 | End: 2017-06-08

## 2017-06-08 RX ADMIN — SODIUM CHLORIDE 925 ML: 900 IRRIGANT IRRIGATION at 14:41

## 2017-06-08 RX ADMIN — Medication: at 07:54

## 2017-06-08 RX ADMIN — KETOROLAC TROMETHAMINE 30 MG: 30 INJECTION, SOLUTION INTRAMUSCULAR at 22:13

## 2017-06-08 RX ADMIN — EPHEDRINE SULFATE 5 MG: 50 INJECTION INTRAMUSCULAR; INTRAVENOUS; SUBCUTANEOUS at 08:01

## 2017-06-08 RX ADMIN — ZOLPIDEM TARTRATE 5 MG: 5 TABLET, FILM COATED ORAL at 00:36

## 2017-06-08 RX ADMIN — SODIUM CHLORIDE, POTASSIUM CHLORIDE, SODIUM LACTATE AND CALCIUM CHLORIDE 1000 ML: 600; 310; 30; 20 INJECTION, SOLUTION INTRAVENOUS at 07:10

## 2017-06-08 RX ADMIN — CEFAZOLIN SODIUM 2 G: 2 INJECTION, SOLUTION INTRAVENOUS at 14:00

## 2017-06-08 RX ADMIN — EPHEDRINE SULFATE 5 MG: 50 INJECTION INTRAMUSCULAR; INTRAVENOUS; SUBCUTANEOUS at 07:58

## 2017-06-08 RX ADMIN — ONDANSETRON 4 MG: 2 INJECTION INTRAMUSCULAR; INTRAVENOUS at 14:03

## 2017-06-08 RX ADMIN — EPHEDRINE SULFATE 5 MG: 50 INJECTION INTRAMUSCULAR; INTRAVENOUS; SUBCUTANEOUS at 08:19

## 2017-06-08 RX ADMIN — FENTANYL CITRATE 100 MCG: 50 INJECTION, SOLUTION INTRAMUSCULAR; INTRAVENOUS at 14:03

## 2017-06-08 RX ADMIN — MORPHINE SULFATE 3 MG: 1 INJECTION EPIDURAL; INTRATHECAL; INTRAVENOUS at 14:21

## 2017-06-08 RX ADMIN — KETOROLAC TROMETHAMINE 30 MG: 30 INJECTION, SOLUTION INTRAMUSCULAR at 16:18

## 2017-06-08 RX ADMIN — SENNOSIDES AND DOCUSATE SODIUM 1 TABLET: 8.6; 5 TABLET ORAL at 22:13

## 2017-06-08 RX ADMIN — SODIUM CHLORIDE, POTASSIUM CHLORIDE, SODIUM LACTATE AND CALCIUM CHLORIDE: 600; 310; 30; 20 INJECTION, SOLUTION INTRAVENOUS at 08:07

## 2017-06-08 RX ADMIN — SODIUM CITRATE AND CITRIC ACID MONOHYDRATE 30 ML: 500; 334 SOLUTION ORAL at 13:55

## 2017-06-08 RX ADMIN — LIDOCAINE HYDROCHLORIDE,EPINEPHRINE BITARTRATE 20 ML: 20; .005 INJECTION, SOLUTION EPIDURAL; INFILTRATION; INTRACAUDAL; PERINEURAL at 14:03

## 2017-06-08 RX ADMIN — OXYTOCIN-SODIUM CHLORIDE 0.9% IV SOLN 30 UNIT/500ML 100 ML/HR: 30-0.9/5 SOLUTION at 19:00

## 2017-06-08 RX ADMIN — ACETAMINOPHEN 975 MG: 325 TABLET, FILM COATED ORAL at 18:59

## 2017-06-08 RX ADMIN — OXYTOCIN-SODIUM CHLORIDE 0.9% IV SOLN 30 UNIT/500ML 1 MILLI-UNITS/MIN: 30-0.9/5 SOLUTION at 13:30

## 2017-06-08 RX ADMIN — FENTANYL CITRATE 100 MCG: 50 INJECTION INTRAMUSCULAR; INTRAVENOUS at 06:39

## 2017-06-08 RX ADMIN — FENTANYL CITRATE 100 MCG: 50 INJECTION INTRAMUSCULAR; INTRAVENOUS at 04:53

## 2017-06-08 RX ADMIN — MIDAZOLAM HYDROCHLORIDE 1.5 MG: 1 INJECTION, SOLUTION INTRAMUSCULAR; INTRAVENOUS at 14:03

## 2017-06-08 RX ADMIN — SODIUM CHLORIDE, POTASSIUM CHLORIDE, SODIUM LACTATE AND CALCIUM CHLORIDE: 600; 310; 30; 20 INJECTION, SOLUTION INTRAVENOUS at 06:39

## 2017-06-08 RX ADMIN — OXYTOCIN-SODIUM CHLORIDE 0.9% IV SOLN 30 UNIT/500ML 30 ML: 30-0.9/5 SOLUTION at 14:20

## 2017-06-08 RX ADMIN — DEXAMETHASONE SODIUM PHOSPHATE 8 MG: 4 INJECTION, SOLUTION INTRA-ARTICULAR; INTRALESIONAL; INTRAMUSCULAR; INTRAVENOUS; SOFT TISSUE at 14:03

## 2017-06-08 RX ADMIN — SODIUM CHLORIDE, POTASSIUM CHLORIDE, SODIUM LACTATE AND CALCIUM CHLORIDE: 600; 310; 30; 20 INJECTION, SOLUTION INTRAVENOUS at 12:29

## 2017-06-08 RX ADMIN — Medication 30 ML: at 13:55

## 2017-06-08 RX ADMIN — OXYTOCIN-SODIUM CHLORIDE 0.9% IV SOLN 30 UNIT/500ML 120 ML: 30-0.9/5 SOLUTION at 14:40

## 2017-06-08 NOTE — PLAN OF CARE
Eman here for cervidil induction for postdates and poly.  FHR reassuring, denies feeling ctx's, srom, or bleeding.  Gonzalez of 4.  Dr. Reeves updated and cervidil ordered.  POC explained and pt agreeable.  SL started and labs collected.  Cervidil placed at 2050.  All questions answered

## 2017-06-08 NOTE — PROVIDER NOTIFICATION
06/08/17 0030   Provider Notification   Provider Name/Title    Method of Notification Phone   Request Evaluate - Remote   Notification Reason Patient Request   Dr.Evanson called with medication request for Ambien. Verbal order received for Ambien 5 mg PO.

## 2017-06-08 NOTE — ANESTHESIA POSTPROCEDURE EVALUATION
Patient: Leonor Reynoso    Procedure(s):   section - Wound Class: II-Clean Contaminated    Diagnosis:pregnancy  Diagnosis Additional Information: FTP with category 2 FT  Dr. Engel    Anesthesia Type:  Epidural    Note:  Anesthesia Post Evaluation    Patient location during evaluation: Bedside and OB PACU  Patient participation: Able to participate in evaluation but full recovery from regional anesthesia has not yet ocurrred but is anticipated to occur within 48 hours  Level of consciousness: awake  Pain management: adequate  Airway patency: patent  Cardiovascular status: acceptable  Respiratory status: acceptable  Hydration status: acceptable  PONV: none             Last vitals:  Vitals:    17 1530 17 1535 17 1540   BP: 103/54 96/55 97/52   Resp:      Temp:  97.9  F (36.6  C)    SpO2:            Electronically Signed By: Emanuel Gary MD  2017  3:53 PM

## 2017-06-08 NOTE — ANESTHESIA PREPROCEDURE EVALUATION
PAC NOTE:       ANESTHESIA PRE EVALUATION:  Anesthesia Evaluation       history and physical reviewed .      No history of anesthetic complications          ROS/MED HX    ENT/Pulmonary:       Neurologic:       Cardiovascular:         METS/Exercise Tolerance:     Hematologic:         Musculoskeletal:         GI/Hepatic:         Renal/Genitourinary:         Endo:         Psychiatric:         Infectious Disease:         Malignancy:         Other:                     Physical Exam      Airway     Dental     Cardiovascular       Pulmonary     Other findings: Epidural for vaginal delivery         Anesthesia Plan      History & Physical Review      ASA Status:  .  OB Epidural Asa: 1            Postoperative Care      Consents  Anesthetic plan, risks, benefits and alternatives discussed with:  Patient..                            .

## 2017-06-08 NOTE — H&P
[facility]    History and Physical  Obstetrics and Gynecology     Date of Admission:  2017    Assessment & Plan   Leonor Reynoso is a 26 year old female  who presents for induction of labor  ASSESSMENT:   IUP @ 41w1d  NST reactive.  Category  I    PLAN:   Admit - see IP orders  cervical ripening with misoprostol  Pain medication epidural  Anticipate   Labor induction with Pitocin if no cervical change  Polyhydramnios: will perform controlled AROM when appropriate  Language barrier: [place] speaking, declines ,  present and interpreting  Hepatitis B non-immune    Dane Engel MD (pager [phone])  [facility] [place] [facility]    History of Present Illness   Leonor Reynoso is a 26 year old female  @ 41w1d Estimated Date of Delivery: May 31, 2017 is calculated from 8w5 US is [facility]  for induction of labor.  Indication late-term, polyhydramnios. Has received misoprostol overnight and is now antwon and changing spontaneously. Epidural placed with good pain relief.    PRENATAL COURSE  Prenatal care was received at [facility] [place] [facility] and the course was complicated by polyhydramnios, language barrier, HepB non-immune, fetal EIF and thickened nuchal fold with no further screening, resolved placenta previa    Recent Labs   Lab Test  17   2100   ABO  O   RH   Pos   AS  Neg     Rhogam not indicated   Recent Labs   Lab Test 05/03/17 10/26/16   HEPBANG   --   negative   HIAGAB   --   negative   GBS  negative   --    RUQIGG   --   Immune       Past Medical History    I have reviewed this patient's medical history and updated it with pertinent information if needed.   History reviewed. No pertinent past medical history.    Past Surgical History   I have reviewed this patient's surgical history and updated it with pertinent information if needed.  History reviewed. No pertinent surgical  history.    Prior to Admission Medications   Prior to Admission Medications   Prescriptions Last Dose Informant Patient Reported? Taking?   Prenatal Vit-Fe Fumarate-FA (PRENATAL MULTIVITAMIN  PLUS IRON) 27-0.8 MG TABS per tablet 6/7/2017 at Unknown time  Yes Yes   Sig: Take 1 tablet by mouth daily      Facility-Administered Medications: None     Allergies   No Known Allergies    Social History   I have reviewed this patient's social history and updated it with pertinent information if needed. Leonor Reynoso  reports that she has never smoked. She does not have any smokeless tobacco history on file. She reports that she does not drink alcohol or use illicit drugs.    Family History   I have reviewed this patient's family history and updated it with pertinent information if needed.   No family history on file.    Immunization History   Immunizations are up to date    Physical Exam   Temp: 98.2  F (36.8  C) Temp src: Oral BP: 129/64     Resp: 16        Vital Signs with Ranges  Temp:  [98  F (36.7  C)-98.3  F (36.8  C)] 98.2  F (36.8  C)  Resp:  [16-18] 16  BP: ()/(52-68) 129/64    Abdomen: gravid, single vertex fetus, non-tender, EFW 3800 g  Cervical Exam: per nursing 4.5/ 80/ Mid/ average/ -3     Fetal Heart Tones: 130 bpm baseline, moderate variablility, + accels, no decels and Category I  TOCO:   external monitor and frequency q 3-5 minutes    Constitutional: healthy and alert   Respiratory: breathing unlabored  Cardiovascular: regular rate and rhythm, pedal pulses 2+  Neurologic: Awake, alert  Neuropsychiatric: cooperative, mood good, stable, appropriate

## 2017-06-08 NOTE — PROVIDER NOTIFICATION
06/08/17 0805   Provider Notification   Provider Name/Title Dr Engel   Method of Notification At Bedside   Request Evaluate in Person   Notification Reason Labor Status   MD at bedside; orders received to start pitocin after SVE if patient's cervix less than 5.5. Will continue to monitor.

## 2017-06-08 NOTE — ANESTHESIA PREPROCEDURE EVALUATION
PAC NOTE:       ANESTHESIA PRE EVALUATION:  Anesthesia Evaluation     . Pt has had prior anesthetic. Type: Regional    No history of anesthetic complications          ROS/MED HX    ENT/Pulmonary:  - neg pulmonary ROS     Neurologic:  - neg neurologic ROS     Cardiovascular:  - neg cardiovascular ROS       METS/Exercise Tolerance:     Hematologic:  - neg hematologic  ROS       Musculoskeletal:  - neg musculoskeletal ROS       GI/Hepatic:  - neg GI/hepatic ROS       Renal/Genitourinary:  - ROS Renal section negative       Endo:  - neg endo ROS       Psychiatric:  - neg psychiatric ROS       Infectious Disease:  - neg infectious disease ROS       Malignancy:      - no malignancy   Other: Comment: At term in labor with category 2 fetal tracing for emergent C/S   (+) Possibly pregnant                    Physical Exam  Normal systems: cardiovascular, pulmonary and dental    Airway   Mallampati: II  TM distance: >3 FB  Neck ROM: full    Dental     Cardiovascular   Rhythm and rate: regular and normal      Pulmonary    breath sounds clear to auscultation    Other findings: Lab Test        06/07/17     05/10/16                       2100          1526          WBC           --          10.2          HGB          12.7         13.5          MCV           --          87            PLT           --          290            Lab Test        05/10/16                       1526          NA           138           POTASSIUM    3.8           CHLORIDE     104           CO2          26            BUN          5*            CR           0.47*         ANIONGAP     8             ARIANA          9.0           GLC          81                          Anesthesia Plan      History & Physical Review  History and physical reviewed and following examination; no interval change.    ASA Status:  2 emergent.    NPO Status:  Waived due to emergency, full stomach and > 4 hours    Plan for Epidural   PONV prophylaxis:  Ondansetron (or other 5HT-3) and  Dexamethasone or Solumedrol       Postoperative Care  Postoperative pain management:  IV analgesics, Oral pain medications, Neuraxial analgesia and Multi-modal analgesia.      Consents  Anesthetic plan, risks, benefits and alternatives discussed with:  Patient.  Use of blood products discussed: No .   .                            .

## 2017-06-08 NOTE — OP NOTE
[facility]  Operative Note    Patient: Leonor Reynoso  : 1990  MRN: [id number]    Date of Service: 2017    Pre-operative diagnosis:  - Intrauterine pregnancy @ 41w1d  - Category 2 fetal heart tracing remote from delivery  - Arrest of dilation  - Polyhydramnios    Post-operative diagnosis:  - Same, sp  section    Procedure:   - Primary low transverse  section with 2 layer uterine closure    Surgeon: Dane Engel MD    Anesthesia: Epidural    Antibiotics: 2 g cefazolin prior to incision    EBL: 800 mL  Urine: clear    Specimens: none  Complications: none    Findings: At 1419 a male infant in cephalic presentation was delivered. APGARS were  8   and 9   at one and five minutes. Weight was 4440 g. Amniotic fluid clear. Normal uterus, fallopian tubes, and ovaries. Placenta intact with 3 vessel cord    Indications: Leonor Reynoso is a 26 year old  @ 41w1d who presented for induction of labor due to late term and polyhydramnios. Received dinoprostone insert overnight then began laboring spontaneously. Received epidural for pain relief. Dilated to 7 cm and had slow controlled AROM with needling of membranes for clear fluid. Over next 3 hours failed to dilate further and fetal heart tracing developed variable decelerations. Pitocin augmentation was started and immediately fetus had prolonged deceleration to 60s for 4 minutes, followed by return to baseline with moderate variability but recurrent late decelerations. Decision was made to proceed with  section. Discussed risks, benefits, and alternatives to the procedure including risk of infection, bleeding, injury to local organs, injury to baby, and the patient agreed and signed written informed consent.    Technique: The patient was taken to the operating room where she was placed in the dorsal supine position with a leftward tilt. She received epidural anesthesia. She received  cefazolin IV prior to the procedure. A hill catheter was placed in her bladder. The patient was then prepped and draped in the usual sterile fashion. Testing with Allis clamp demonstrated adequate anesthesia in the operative field. A time-out was performed identifying the correct patient and procedure.     A 10-blade scalpel was used to create a Pfannenstiel incision 2 cm superior to the pubic symphysis and carried through the subcutaneous tissue to the underlying fascia. The fascia was incised in the midline and the fascial incision extended laterally with Taylor scissors. The superior aspect of the fascial incision was then grasped with Kocher clamps, elevated, and the underlying rectus muscles dissected off bluntly and sharply. Attention was then turned to the inferior aspect of the fascial incision, which, in a similar fashion, was grasped with Kocher clamps, elevated, and the underlying rectus muscles dissected off bluntly and sharply. The rectus muscles were then bluntly  in the midline. The peritoneum was identified and entered bluntly with digital pressure. The entire incision was then extended with blunt pressure and careful electrocautery dissection. A plastic double-ring retractor was placed and the anterior peritoneum swept to ensure no visceral organs trapped.    A 10-blade scalpel was used to incise the lower uterine segment in a transverse fashion. The incision was extended with digital pressure cranially and caudally. The  in cephalic LOT presentation was delivered atraumatically. The cord was clamped after a delay of 60 seconds, cut, and the infant handed off to waiting staff. A section of cord was clamped and held for gases if needed.    The placenta was delivered spontaneously with gentle traction on the cord and external massage of the uterus. A wet lap sponge used to clear the uterine cavity of all clots and debris. The hysterotomy was examined and no extensions observed. The  hysterotomy was closed with a running locked suture of 0 vicryl. A second layer of 0 monocryl was used to imbricate the incision. Excellent hemostasis was noted. The pericolic gutters were examined and suctioned free of clots and debris. The bladder flap and rectus muscles were examined and noted to be hemostatic. The fascial incision was closed with a running suture of 0 vicryl. The subcutaneous tissue was reapproximated with interrupted sutures of 2-0 plain. The skin incision was closed with subcutaneous absorbable staples. An island dressing was placed. The uterus was massaged to express all blood and clot through the vagina.    Final vaginal exam yielded no foreign bodies. Instrument, sponge, and needle counts were correct times two. The patient tolerated the procedure well and was transported to the recovery room in stable condition.    Dane Engel MD  6/8/2017

## 2017-06-08 NOTE — PROVIDER NOTIFICATION
06/08/17 1320   Provider Notification   Provider Name/Title    Method of Notification Electronic Page;Phone   Request Evaluate - Remote   Notification Reason Decels;SVE;Status Update   MD reviewed strip from office following decel in the 90s. FHR tracing not connected completely. FRH back in the 115s MD ordered Pitocin to be started. Will monitor FHR tracing from office to watch how fetus tolerates Pitocin.

## 2017-06-08 NOTE — PROVIDER NOTIFICATION
06/08/17 0934   Provider Notification   Provider Name/Title Dr Engel   Method of Notification At Bedside   Request Evaluate in Person   Notification Reason SVE;Membrane Status   MD at bedside; AROM, SVE. No new orders.

## 2017-06-08 NOTE — PROVIDER NOTIFICATION
06/08/17 1242   Provider Notification   Provider Name/Title Dr Engel   Method of Notification Electronic Page   Request Evaluate - Remote   Notification Reason SVE;Labor Status;Variability Change;Decels   MD notified of PD x2 min to the 100s and PD x 3min to the 60s, interventions done. SVE. Contraction pattern. Orders received to start pitocin. Place FSE, if needed.

## 2017-06-08 NOTE — PROGRESS NOTES
Cervidil fell out around 4am. Patient has been spontaneously laboring since that time and is now 4-5 cm. Continue spontaneous labor. No desire for epidural at this time. She has received IV pain medications x1 dose thus far.

## 2017-06-08 NOTE — PROVIDER NOTIFICATION
06/08/17 0418   Provider Notification   Provider Name/Title    Method of Notification In Department   Request Evaluate - Remote   Notification Reason Uterine Activity;Status Update    at the desk, updated. Patient coping with labor. Contractions are regular; mild with palpation. FHT category 1 tracing with accelerations. Oral Cytotec not administered. No new orders. Continue with plan of care.

## 2017-06-08 NOTE — PROGRESS NOTES
Controlled AROM with needling of bulging membranes for copious clear fluid, fetal station to -2 and engaged, cervix 7/90/-2. Continue spontaneous labor.    Dane Engel MD

## 2017-06-08 NOTE — ANESTHESIA PROCEDURE NOTES
Peripheral nerve/Neuraxial procedure note : epidural catheter  Pre-Procedure  Performed by TRE YOUNG  Referred by SHAKA  Location: OB    Procedure Times:6/8/2017 7:37 AM and 6/8/2017 7:57 AM  Pre-Anesthestic Checklist: patient identified, IV checked, risks and benefits discussed, informed consent, monitors and equipment checked, pre-op evaluation and at physician/surgeon's request    Timeout  Correct Patient: Yes   Correct Procedure: Yes   Correct Site: Yes   Correct Laterality: N/A   Correct Position: Yes   Site Marked: N/A   .   Procedure Documentation    Diagnosis:labor.    Procedure:    Epidural catheter.  Insertion Site:L3-4  (midline approach) Injection technique: LORT air   Local skin infiltrated with 2 mL of 1% lidocaine.  DEENA at 6 cm     Patient Prep;mask, sterile gloves, povidone-iodine 7.5% surgical scrub, patient draped.  .  Needle: Touhy needle (17 G. 3.5 in). # of attempts: 1. # of redirects:.  Spinal Needle: . . . Catheter: 19 G . .  Catheter threaded easily  .  11 cm at skin.   .    Assessment/Narrative  Paresthesias: No.  .  .  Aspiration negative for heme or CSF  . Test dose of 3 mL lidocaine 1.5% w/ 1:200,000 epinephrine at. Test dose negative for signs of intravascular, subdural or intrathecal injection. Comments:  Bolus 8cc .25marc

## 2017-06-08 NOTE — ANESTHESIA CARE TRANSFER NOTE
Patient: Leonor Reynoso    Procedure(s):   section - Wound Class: II-Clean Contaminated    Diagnosis: pregnancy  Diagnosis Additional Information: FTP with category 2 FT  Dr. Engel    Anesthesia Type:   Epidural     Note:  Airway :Room Air  Patient transferred to:Labor and Delivery  Comments: To L&D, Awake, VSS, SV, Report to RN      Vitals: (Last set prior to Anesthesia Care Transfer)    CRNA VITALS  2017 1422 - 2017 1501      2017             NIBP: 120/48    Pulse: 88    NIBP Mean: 77    SpO2: 94 %                Electronically Signed By: Dean Dennis Severson, APRN CRNA  2017  3:01 PM

## 2017-06-08 NOTE — PROVIDER NOTIFICATION
06/08/17 0230   Provider Notification   Provider Name/Title    Method of Notification Phone   Request Evaluate - Remote   Notification Reason Labor Status;Uterine Activity;Pain;Status Update;SVE   Dr.Evanson called with an update. Cervidil fell out at 0154. SVE 2/50/-3, crawford score 4. FHT difficult to monitor; needing frequent adjustments. Contractions every 2-5 min apart, palpating mild. Patient rates pain 10/10, discussed pain options however patient undecided. Verbal order to wait on any additional cervical ripening at this time unless contractions become more than 5 min apart, then verbal order for Cytotec 25 mcg PO for 4 doses. Patient may have fentanyl 100 mcg IVP every 1 hr prn for pain medication. POC discussed with patient and mother.

## 2017-06-08 NOTE — PROVIDER NOTIFICATION
06/08/17 0525   Provider Notification   Provider Name/Title    Method of Notification In Department   Request Evaluate - Remote   Notification Reason Labor Status;Uterine Activity;Pain;Status Update;SVE    at the desk, updated. SVE 4-5/80/-3 with BBOW. Regular contractions every 1.5-5 min apart, palpating moderate. FHT difficult to monitor related to maternal habitus and polyhydramnios. Category 1 tracing. VSS. Patient received 1 dose of IVP Fentanyl for pain rated at 10/10 and feeling relief and resting. Intrapartum orders received and placed.  will be rounding this morning.

## 2017-06-09 LAB — HGB BLD-MCNC: 11 G/DL (ref 11.7–15.7)

## 2017-06-09 PROCEDURE — 36415 COLL VENOUS BLD VENIPUNCTURE: CPT | Performed by: OBSTETRICS & GYNECOLOGY

## 2017-06-09 PROCEDURE — 25000128 H RX IP 250 OP 636: Performed by: OBSTETRICS & GYNECOLOGY

## 2017-06-09 PROCEDURE — 40000083 ZZH STATISTIC IP LACTATION SERVICES 1-15 MIN

## 2017-06-09 PROCEDURE — 85018 HEMOGLOBIN: CPT | Performed by: OBSTETRICS & GYNECOLOGY

## 2017-06-09 PROCEDURE — 25000132 ZZH RX MED GY IP 250 OP 250 PS 637: Performed by: OBSTETRICS & GYNECOLOGY

## 2017-06-09 PROCEDURE — 12000029 ZZH R&B OB INTERMEDIATE

## 2017-06-09 RX ADMIN — SODIUM CHLORIDE, SODIUM LACTATE, POTASSIUM CHLORIDE, CALCIUM CHLORIDE AND DEXTROSE MONOHYDRATE: 5; 600; 310; 30; 20 INJECTION, SOLUTION INTRAVENOUS at 08:45

## 2017-06-09 RX ADMIN — ACETAMINOPHEN 975 MG: 325 TABLET, FILM COATED ORAL at 20:15

## 2017-06-09 RX ADMIN — OXYCODONE HYDROCHLORIDE 5 MG: 5 TABLET ORAL at 22:45

## 2017-06-09 RX ADMIN — SENNOSIDES AND DOCUSATE SODIUM 1 TABLET: 8.6; 5 TABLET ORAL at 10:03

## 2017-06-09 RX ADMIN — SODIUM CHLORIDE, SODIUM LACTATE, POTASSIUM CHLORIDE, CALCIUM CHLORIDE AND DEXTROSE MONOHYDRATE: 5; 600; 310; 30; 20 INJECTION, SOLUTION INTRAVENOUS at 00:15

## 2017-06-09 RX ADMIN — KETOROLAC TROMETHAMINE 30 MG: 30 INJECTION, SOLUTION INTRAMUSCULAR at 03:59

## 2017-06-09 RX ADMIN — OXYCODONE HYDROCHLORIDE 5 MG: 5 TABLET ORAL at 11:07

## 2017-06-09 RX ADMIN — IBUPROFEN 800 MG: 400 TABLET ORAL at 22:45

## 2017-06-09 RX ADMIN — PRENATAL VIT W/ FE FUMARATE-FA TAB 27-0.8 MG 1 TABLET: 27-0.8 TAB at 10:03

## 2017-06-09 RX ADMIN — KETOROLAC TROMETHAMINE 30 MG: 30 INJECTION, SOLUTION INTRAMUSCULAR at 10:03

## 2017-06-09 RX ADMIN — OXYCODONE HYDROCHLORIDE 5 MG: 5 TABLET ORAL at 16:51

## 2017-06-09 RX ADMIN — IBUPROFEN 800 MG: 400 TABLET ORAL at 16:51

## 2017-06-09 RX ADMIN — ACETAMINOPHEN 975 MG: 325 TABLET, FILM COATED ORAL at 11:53

## 2017-06-09 RX ADMIN — ACETAMINOPHEN 975 MG: 325 TABLET, FILM COATED ORAL at 03:59

## 2017-06-09 RX ADMIN — SENNOSIDES AND DOCUSATE SODIUM 1 TABLET: 8.6; 5 TABLET ORAL at 20:15

## 2017-06-09 RX ADMIN — OXYCODONE HYDROCHLORIDE 5 MG: 5 TABLET ORAL at 20:14

## 2017-06-09 NOTE — PROVIDER NOTIFICATION
Called and notified Dr. Kelly of patient pain level of 10 in spite of torodal given at 10:00, oxycodone, and 975 of tylenol given since. Fundas firm and off to right up 2.

## 2017-06-09 NOTE — PLAN OF CARE
Problem: Goal Outcome Summary  Goal: Goal Outcome Summary  Outcome: Improving  Patient meeting expected outcomes. Breastfeeding well, patients mother in room assisting with breastfeeding. Pain well controlled with toradol and tylenol. Davenport catheter draining concentrated adriel colored urine. Encouraged patient to increase fluid intake. Attempted to get out of bed, sat at edge of bed and c/o right side incision pain. Incision pain decreased dramatically when laid back in bed. Dressing clean dry and intact. Bonding appropriately with baby.

## 2017-06-09 NOTE — PLAN OF CARE
Problem: Postpartum ( Delivery) (Adult)  Goal: Signs and Symptoms of Listed Potential Problems Will be Absent or Manageable (Postpartum)  Signs and symptoms of listed potential problems will be absent or manageable by discharge/transition of care (reference Postpartum ( Delivery) (Adult) CPG).   Dr. Kelly in to see. No orders received. Encouraged patient to get up and about more. Stated uterus is large and a bit floppy, because of having such a big baby.

## 2017-06-09 NOTE — PLAN OF CARE
Problem: Postpartum ( Delivery) (Adult)  Goal: Signs and Symptoms of Listed Potential Problems Will be Absent or Manageable (Postpartum)  Signs and symptoms of listed potential problems will be absent or manageable by discharge/transition of care (reference Postpartum ( Delivery) (Adult) CPG).    17 1917   Postpartum ( Delivery)   Problems Assessed (Postpartum ) all   Problems Present (Postpartum ) none   Data: Leonor Reynoso transferred to postpartum room 451 via wheelchair at 1830. Baby transferred via parent's arms.  Action: Receiving unit notified of transfer: Yes. Patient and family notified of room change. Report given to Eugenia Josue at 1900. Belongings sent to receiving unit. Accompanied by Registered Nurse. Oriented patient to surroundings. Call light within reach. ID bands double-checked with receiving RN.  Response: Patient tolerated transfer and is stable.

## 2017-06-09 NOTE — PROGRESS NOTES
[facility] Obstetrics Post-Op / Progress Note         Assessment and Plan:    Assessment:   Post-operative day #1  Low transverse primary  section  L&D complications: Cephalopelvic disproportion  Failure to progress      Doing well.  Clean wound without signs of infection.  No excessive bleeding  Pain well-controlled.      Plan:   Ambulation encouraged  Monitor wound for signs of infection  Pain control measures as needed  Reportable signs and symptoms dicussed with the patient           Interval History:   Doing well.  Pain is well-controlled.  No fevers.  No history of wound drainage, warmth or significant erythema.  Good appetite.  Denies chest pain, shortness of breath, nausea or vomiting.            Significant Problems:      Patient Active Problem List   Diagnosis     Indication for care in labor or delivery     Breech presentation of fetus     S/P  section             Review of Systems:    The patient denies any chest pain, shortness of breath, excessive pain, fever, chills, purulent drainage from the wound, nausea or vomiting.          Medications:   All medications related to the patient's surgery have been reviewed          Physical Exam:     All vitals stable  Patient Vitals for the past 12 hrs:   BP Temp Temp src Resp SpO2   17 0800 104/62 98.5  F (36.9  C) Oral 16 98 %   17 0400 102/59 98.1  F (36.7  C) Oral 14 97 %   17 0045 113/62 98.9  F (37.2  C) Oral 16 98 %     Wound clean and dry with minimal or no drainage.  Surrounding skin with minimal erythema.  Ext NT          Data:     Hemoglobin   Date Value Ref Range Status   2017 11.0 (L) 11.7 - 15.7 g/dL Final   2017 12.7 11.7 - 15.7 g/dL Final   05/10/2016 13.5 11.7 - 15.7 g/dL Final     -    Kun Kelly MD  2017 9:56 AM

## 2017-06-09 NOTE — PLAN OF CARE
Problem: Goal Outcome Summary  Goal: Goal Outcome Summary  Outcome: Improving  Patient has refused and been very reluctant to get up when encouraged and assisted throughout the day. I did manage to assist her up to the side of the bed, but, no further. See provider notification note filed at 1238 and next note filed 12:42.  and mother attentive at the bedside. She is stable, and actively working on breastfeeding with the assist of her mother. Her  wanted to start her pumping on the right side. Breast pump instructions and rationale for initiating electric breast pumping given. Plan to D/C sergio soon.

## 2017-06-09 NOTE — LACTATION NOTE
Lactation visit. Parents were attempting to latch baby to the breasts. Baby was bundled in blankets so removed the extra and place baby closer to mom and latched and NW. Enc breast compression while baby sucked to get more milk to baby. More swallowing noted as mom pressed. Encouraged mom to call us PRN.

## 2017-06-10 PROCEDURE — 25000132 ZZH RX MED GY IP 250 OP 250 PS 637: Performed by: OBSTETRICS & GYNECOLOGY

## 2017-06-10 PROCEDURE — 12000029 ZZH R&B OB INTERMEDIATE

## 2017-06-10 RX ADMIN — SENNOSIDES AND DOCUSATE SODIUM 1 TABLET: 8.6; 5 TABLET ORAL at 21:29

## 2017-06-10 RX ADMIN — IBUPROFEN 800 MG: 400 TABLET ORAL at 22:59

## 2017-06-10 RX ADMIN — SENNOSIDES AND DOCUSATE SODIUM 1 TABLET: 8.6; 5 TABLET ORAL at 08:38

## 2017-06-10 RX ADMIN — ACETAMINOPHEN 975 MG: 325 TABLET, FILM COATED ORAL at 21:29

## 2017-06-10 RX ADMIN — OXYCODONE HYDROCHLORIDE 5 MG: 5 TABLET ORAL at 19:15

## 2017-06-10 RX ADMIN — IBUPROFEN 800 MG: 400 TABLET ORAL at 16:51

## 2017-06-10 RX ADMIN — IBUPROFEN 800 MG: 400 TABLET ORAL at 11:01

## 2017-06-10 RX ADMIN — ACETAMINOPHEN 975 MG: 325 TABLET, FILM COATED ORAL at 12:03

## 2017-06-10 RX ADMIN — ACETAMINOPHEN 975 MG: 325 TABLET, FILM COATED ORAL at 04:19

## 2017-06-10 RX ADMIN — PRENATAL VIT W/ FE FUMARATE-FA TAB 27-0.8 MG 1 TABLET: 27-0.8 TAB at 08:38

## 2017-06-10 RX ADMIN — IBUPROFEN 800 MG: 400 TABLET ORAL at 04:19

## 2017-06-10 NOTE — PLAN OF CARE
Problem: Goal Outcome Summary  Goal: Goal Outcome Summary  Outcome: No Change  VS stable. Ibuprofen and tylenol for pain. Incision intact, open to air. Fundus continues to be 2U and left of midline, MD aware. Fundus firm, scant bleeding. Voiding. Ambulating. Breastfeeding fair, needs assistance to help baby latch. Nipple shield initiated d/t flat nipples, colostrum seen in shield.

## 2017-06-10 NOTE — PROGRESS NOTES
[facility] Obstetrics Post-Op / Progress Note    Interval History   Doing well.  Pain is well-controlled.  No fevers.  No history of wound drainage, warmth or significant erythema.  Good appetite.  Denies chest pain, shortness of breath, nausea or vomiting.  Ambulatory.  Breastfeeding well, but concern that left breast is not producing as much milk.     Medications     oxytocin in 0.9% NaCl 100 mL/hr (17 1900)     dextrose 5% lactated ringers 125 mL/hr at 17 0845     oxytocin in 0.9% NaCl       NO Rho (D) immune globulin (RhoGam) needed - mother Rh POSITIVE       - MEDICATION INSTRUCTIONS -       - MEDICATION INSTRUCTIONS -         prenatal multivitamin  plus iron  1 tablet Oral Daily     senna-docusate  1-2 tablet Oral BID     acetaminophen  975 mg Oral Q8H       Physical Exam   Temp: 98.2  F (36.8  C) Temp src: Oral BP: 123/68   Heart Rate: 91 Resp: 16        Vitals:    17 1945   Weight: 86.2 kg (190 lb)     Vital Signs with Ranges  Temp:  [98.2  F (36.8  C)-98.8  F (37.1  C)] 98.2  F (36.8  C)  Heart Rate:  [87-94] 91  Resp:  [16] 16  BP: (111-123)/(54-69) 123/68  I/O last 3 completed shifts:  In: -   Out: 2325 [Urine:2325]    Uterine fundus is firm, non-tender and 4 finger breadths above the umbilicus  Incision C/D/I  Extremities Non-tender    Data   Recent Labs   Lab Test  17   2100   ABO  O   RH   Pos   AS  Neg     Recent Labs   Lab Test  17   0708  17   2100   HGB  11.0*  12.7     Recent Labs   Lab Test 10/26/16   ANABELLE  Immune       Assessment & Plan   -2 Days Post-Op Procedure(s): primary  section for persistent category 2 tracing remote from delivery    Acute blood loss anemia: asymptomatic and hemodynamically stable.   Doing well.  Clean wound without signs of infection.  Pain well-controlled.  Breast feeding strategies discussed  Continue to follow bleeding closely given enlarged size of uterus. No fibroids commented on in perinatology  ultrasounds. No bleeding currently. Says she is able to void well.   Anticipate discharge in 1 days    Jael Reeves MD

## 2017-06-10 NOTE — PLAN OF CARE
Problem: Goal Outcome Summary  Goal: Goal Outcome Summary  Outcome: Improving  Patient is up independent in room, meeting personal needs. Voiding with out difficulty. Fundus firm, continues to be 2U and off to left, though is more midline since fully voiding. VSS. Pain is being managed with Ibuprofen, Tylenol and Oxycodone.  Also using t-pump for additional comfort measures.  Incision to low abdomen is ROULA, well approximated, no signs of infection noted; no redness, no swelling or warmth and no adverse drainage noted.  Breastfeeding is going fair, with encouragement and assistance with latch and to keep infant actively feeding.  Different positions trialed.  Patient is aware to call out for help and that writer will continue to be available to assist with breastfeeding. Bonding well with infant.

## 2017-06-10 NOTE — PLAN OF CARE
Problem: Goal Outcome Summary  Goal: Goal Outcome Summary  Outcome: Improving  Patient is stable, affect bright, much less pain today, gaining strength and independence, uterus remains up 4 finger breadths, and to the left, bladder non palpable and denies problems emptying her bladder, bleeding scant.

## 2017-06-11 VITALS
RESPIRATION RATE: 16 BRPM | DIASTOLIC BLOOD PRESSURE: 70 MMHG | TEMPERATURE: 99.1 F | BODY MASS INDEX: 31.62 KG/M2 | SYSTOLIC BLOOD PRESSURE: 124 MMHG | WEIGHT: 190 LBS | OXYGEN SATURATION: 98 %

## 2017-06-11 PROCEDURE — 25000132 ZZH RX MED GY IP 250 OP 250 PS 637: Performed by: OBSTETRICS & GYNECOLOGY

## 2017-06-11 RX ORDER — BREAST PUMP
1 EACH MISCELLANEOUS PRN
Qty: 1 EACH | Refills: 0 | Status: SHIPPED | OUTPATIENT
Start: 2017-06-11

## 2017-06-11 RX ORDER — OXYCODONE HYDROCHLORIDE 5 MG/1
5 TABLET ORAL EVERY 4 HOURS PRN
Qty: 20 TABLET | Refills: 0 | Status: SHIPPED | OUTPATIENT
Start: 2017-06-11

## 2017-06-11 RX ORDER — IBUPROFEN 600 MG/1
600 TABLET, FILM COATED ORAL EVERY 6 HOURS PRN
Qty: 60 TABLET | Refills: 0 | Status: ON HOLD | OUTPATIENT
Start: 2017-06-11 | End: 2017-06-15

## 2017-06-11 RX ORDER — ACETAMINOPHEN 325 MG/1
650 TABLET ORAL EVERY 4 HOURS PRN
Qty: 100 TABLET | Refills: 0 | Status: SHIPPED | OUTPATIENT
Start: 2017-06-11

## 2017-06-11 RX ADMIN — PRENATAL VIT W/ FE FUMARATE-FA TAB 27-0.8 MG 1 TABLET: 27-0.8 TAB at 09:38

## 2017-06-11 RX ADMIN — ACETAMINOPHEN 975 MG: 325 TABLET, FILM COATED ORAL at 05:46

## 2017-06-11 RX ADMIN — SENNOSIDES AND DOCUSATE SODIUM 1 TABLET: 8.6; 5 TABLET ORAL at 09:38

## 2017-06-11 NOTE — DISCHARGE INSTRUCTIONS
Discharge Instructions for  Section ()  Lactation Phone # [phone]  You had a  section, or . During the , your baby was delivered through a surgical incision in your stomach and uterus. Full recovery after a  can take time. It s important to take care of yourself -- for your own sake and because your new baby needs you. Here are some guidelines to follow at home.  Incision care  Here's how to take care of your incision:    Shower as needed. Pat your incision dry.    Watch your incision for signs of infection, like increasing redness or drainage.    Hold a pillow against the incision when you laugh or cough and when you get up from a lying or sitting position.    Remember, it can take as long as 6 weeks for a  incision to heal.  Activity  Here are some suggestions:    Don t try to take care of anyone other than your baby and yourself.    Remember, the more active you are, the more likely you are to have an increase in your bleeding.    Get lots of rest. Take naps in the afternoon.    Increase your activities gradually.    Plan your activities so that you don t have to go up or down stairs more than necessary.    Do postsurgical deep breathing and coughing exercises. Ask your healthcare provider for instructions.    Don t lift anything heavier than your baby until your healthcare provider tells you it s OK.    Don t drive until your healthcare provider says it s OK.    Don t have sexual intercourse until after you ve had a follow-up appointment with your healthcare provider and you have decided on a birth control method.  Follow-up  Make a follow-up appointment as directed by our staff.  When to call your healthcare provider  Call your healthcare provider right away if you have any of the following:    Fever of 100.4 F (38 C) or higher    Redness, pain, or drainage at your incision site    Bleeding that requires a new sanitary pad every hour    Severe  pain in the abdomen    Pain or urgency with urination    Foul odor from vaginal discharge    Trouble urinating or emptying your bladder    No bowel movement within 1 week after the birth of your baby    Swollen, red, painful area in the leg    Appearance of rash or hives    Sore, red, painful area on the breasts that may be accompanied by flu-like symptoms    Feelings of anxiety, panic, and/or depression     2769-0707 [employer]. [address] [place] [address], [place] [address]. All rights reserved. This information is not intended as a substitute for professional medical care. Always follow your healthcare professional's instructions.

## 2017-06-11 NOTE — LACTATION NOTE
Lactation in to see patient. Baby nursing well with shield in football postion. Lots of swallows heard. Educated on care of nipple shield. Knows someone from lactation will f/u with her in about a week when home.  in interpreting.

## 2017-06-11 NOTE — PLAN OF CARE
Problem: Goal Outcome Summary  Goal: Goal Outcome Summary  Outcome: Improving  Patient meeting expected goals. Is up independent in room, meeting all personal and infant needs.  Breastfeeding is going well, using nipple shield and patient's milk is in.  Pain is being managed with Ibuprofen, Tylenol and Oxycodone.  Incision to low abdomen is ROULA and no signs of infection are noted; no redness, swelling or adverse drainage and well approximated. VSS.  Bonding well with infant.  Using ice packs on incision line for additional comfort.   and family at bedside and are supportive.

## 2017-06-11 NOTE — PROGRESS NOTES
[facility] Obstetrics Post-Op / Progress Note         Assessment and Plan:    Assessment:   Post-operative day #3  Low transverse primary  section  L&D complications: Failure to progress      Doing well.  Normal healing wound.  No excessive bleeding  Pain well-controlled.      Plan:   Discharge later today  Scripts for ibuprofen, Tylenol, oxycodone and a breast pump will be provided.           Interval History:   Doing well.  Pain is well-controlled.  No fevers.  No history of wound drainage, warmth or significant erythema.  Good appetite.  Denies chest pain, shortness of breath, nausea or vomiting.  Ambulatory.  Breastfeeding well.          Significant Problems:      Patient Active Problem List   Diagnosis     Indication for care in labor or delivery     Breech presentation of fetus     S/P  section             Review of Systems:    The patient denies any chest pain, shortness of breath, excessive pain, fever, chills, purulent drainage from the wound, nausea or vomiting.          Medications:   All medications related to the patient's surgery have been reviewed          Physical Exam:     All vitals stable  Patient Vitals for the past 12 hrs:   BP Temp Temp src Heart Rate Resp   17 0752 124/70 99.1  F (37.3  C) Oral 88 16   06/10/17 2355 133/78 98.6  F (37  C) Oral 83 18     Wound clean and dry with minimal or no drainage.  Surrounding skin with minimal erythema.  Ext NT with 1+ edema bilat          Data:     Hemoglobin   Date Value Ref Range Status   2017 11.0 (L) 11.7 - 15.7 g/dL Final   2017 12.7 11.7 - 15.7 g/dL Final   05/10/2016 13.5 11.7 - 15.7 g/dL Final     -    Kun Kelly MD  2017 11:05 AM

## 2017-06-11 NOTE — PLAN OF CARE
Problem: Goal Outcome Summary  Goal: Goal Outcome Summary  Outcome: No Change  VS stable. Ibuprofen for pain. Fundus midline and 4/U, MD aware.- Firm with scant bleeding. Incision intact. Up ad amadeo. Voiding.

## 2017-06-11 NOTE — PROGRESS NOTES
Dicussed dc paperwork, answered all questions, verified bands, d/c to home with spouse and infant.

## 2017-06-13 ENCOUNTER — HOSPITAL ENCOUNTER (INPATIENT)
Facility: CLINIC | Age: 27
LOS: 2 days | Discharge: HOME | End: 2017-06-15
Attending: EMERGENCY MEDICINE | Admitting: INTERNAL MEDICINE
Payer: COMMERCIAL

## 2017-06-13 ENCOUNTER — APPOINTMENT (OUTPATIENT)
Dept: CT IMAGING | Facility: CLINIC | Age: 27
End: 2017-06-13
Attending: EMERGENCY MEDICINE
Payer: COMMERCIAL

## 2017-06-13 DIAGNOSIS — N17.9 ACUTE KIDNEY INJURY (H): ICD-10-CM

## 2017-06-13 DIAGNOSIS — R10.9 RT FLANK PAIN: ICD-10-CM

## 2017-06-13 PROBLEM — N19 RENAL FAILURE: Status: ACTIVE | Noted: 2017-06-13

## 2017-06-13 LAB
ALBUMIN SERPL-MCNC: 2.3 G/DL (ref 3.4–5)
ALBUMIN UR-MCNC: NEGATIVE MG/DL
ALP SERPL-CCNC: 139 U/L (ref 40–150)
ALT SERPL W P-5'-P-CCNC: 22 U/L (ref 0–50)
ANION GAP SERPL CALCULATED.3IONS-SCNC: 9 MMOL/L (ref 3–14)
APPEARANCE UR: CLEAR
AST SERPL W P-5'-P-CCNC: 30 U/L (ref 0–45)
BACTERIA #/AREA URNS HPF: ABNORMAL /HPF
BASOPHILS # BLD AUTO: 0 10E9/L (ref 0–0.2)
BASOPHILS NFR BLD AUTO: 0.2 %
BILIRUB SERPL-MCNC: 0.4 MG/DL (ref 0.2–1.3)
BILIRUB UR QL STRIP: NEGATIVE
BUN SERPL-MCNC: 27 MG/DL (ref 7–30)
CALCIUM SERPL-MCNC: 7.8 MG/DL (ref 8.5–10.1)
CHLORIDE SERPL-SCNC: 109 MMOL/L (ref 94–109)
CO2 SERPL-SCNC: 24 MMOL/L (ref 20–32)
COLOR UR AUTO: ABNORMAL
CREAT SERPL-MCNC: 2.01 MG/DL (ref 0.52–1.04)
DIFFERENTIAL METHOD BLD: ABNORMAL
EOSINOPHIL # BLD AUTO: 0.2 10E9/L (ref 0–0.7)
EOSINOPHIL NFR BLD AUTO: 2 %
ERYTHROCYTE [DISTWIDTH] IN BLOOD BY AUTOMATED COUNT: 12.5 % (ref 10–15)
GFR SERPL CREATININE-BSD FRML MDRD: 30 ML/MIN/1.7M2
GLUCOSE SERPL-MCNC: 87 MG/DL (ref 70–99)
GLUCOSE UR STRIP-MCNC: NEGATIVE MG/DL
HCT VFR BLD AUTO: 33.6 % (ref 35–47)
HGB BLD-MCNC: 11.6 G/DL (ref 11.7–15.7)
HGB UR QL STRIP: ABNORMAL
IMM GRANULOCYTES # BLD: 0.1 10E9/L (ref 0–0.4)
IMM GRANULOCYTES NFR BLD: 0.6 %
KETONES UR STRIP-MCNC: NEGATIVE MG/DL
LEUKOCYTE ESTERASE UR QL STRIP: ABNORMAL
LIPASE SERPL-CCNC: 53 U/L (ref 73–393)
LYMPHOCYTES # BLD AUTO: 1.3 10E9/L (ref 0.8–5.3)
LYMPHOCYTES NFR BLD AUTO: 15.1 %
MCH RBC QN AUTO: 33 PG (ref 26.5–33)
MCHC RBC AUTO-ENTMCNC: 34.5 G/DL (ref 31.5–36.5)
MCV RBC AUTO: 96 FL (ref 78–100)
MONOCYTES # BLD AUTO: 0.5 10E9/L (ref 0–1.3)
MONOCYTES NFR BLD AUTO: 5.1 %
NEUTROPHILS # BLD AUTO: 6.8 10E9/L (ref 1.6–8.3)
NEUTROPHILS NFR BLD AUTO: 77 %
NITRATE UR QL: NEGATIVE
NRBC # BLD AUTO: 0 10*3/UL
NRBC BLD AUTO-RTO: 0 /100
PH UR STRIP: 6 PH (ref 5–7)
PLATELET # BLD AUTO: 223 10E9/L (ref 150–450)
POTASSIUM SERPL-SCNC: 4.2 MMOL/L (ref 3.4–5.3)
PROT SERPL-MCNC: 6 G/DL (ref 6.8–8.8)
RBC # BLD AUTO: 3.52 10E12/L (ref 3.8–5.2)
RBC #/AREA URNS AUTO: 1 /HPF (ref 0–2)
SODIUM SERPL-SCNC: 142 MMOL/L (ref 133–144)
SP GR UR STRIP: 1 (ref 1–1.03)
SQUAMOUS #/AREA URNS AUTO: <1 /HPF (ref 0–1)
URN SPEC COLLECT METH UR: ABNORMAL
UROBILINOGEN UR STRIP-MCNC: 0 MG/DL (ref 0–2)
WBC # BLD AUTO: 8.9 10E9/L (ref 4–11)
WBC #/AREA URNS AUTO: 3 /HPF (ref 0–2)

## 2017-06-13 PROCEDURE — 25000128 H RX IP 250 OP 636: Performed by: PHYSICIAN ASSISTANT

## 2017-06-13 PROCEDURE — 74176 CT ABD & PELVIS W/O CONTRAST: CPT

## 2017-06-13 PROCEDURE — 99223 1ST HOSP IP/OBS HIGH 75: CPT | Mod: AI | Performed by: PHYSICIAN ASSISTANT

## 2017-06-13 PROCEDURE — 99207 ZZC CDG-MDM COMPONENT: MEETS HIGH - UP CODED: CPT | Performed by: PHYSICIAN ASSISTANT

## 2017-06-13 PROCEDURE — 80053 COMPREHEN METABOLIC PANEL: CPT | Performed by: EMERGENCY MEDICINE

## 2017-06-13 PROCEDURE — 85025 COMPLETE CBC W/AUTO DIFF WBC: CPT | Performed by: EMERGENCY MEDICINE

## 2017-06-13 PROCEDURE — 25000128 H RX IP 250 OP 636: Performed by: EMERGENCY MEDICINE

## 2017-06-13 PROCEDURE — 25000132 ZZH RX MED GY IP 250 OP 250 PS 637: Performed by: PHYSICIAN ASSISTANT

## 2017-06-13 PROCEDURE — 81001 URINALYSIS AUTO W/SCOPE: CPT | Performed by: EMERGENCY MEDICINE

## 2017-06-13 PROCEDURE — 12000000 ZZH R&B MED SURG/OB

## 2017-06-13 PROCEDURE — 99285 EMERGENCY DEPT VISIT HI MDM: CPT | Mod: 25

## 2017-06-13 PROCEDURE — 83690 ASSAY OF LIPASE: CPT | Performed by: EMERGENCY MEDICINE

## 2017-06-13 PROCEDURE — 82570 ASSAY OF URINE CREATININE: CPT | Performed by: PHYSICIAN ASSISTANT

## 2017-06-13 PROCEDURE — 84300 ASSAY OF URINE SODIUM: CPT | Performed by: PHYSICIAN ASSISTANT

## 2017-06-13 RX ORDER — HYDROMORPHONE HYDROCHLORIDE 1 MG/ML
0.5 INJECTION, SOLUTION INTRAMUSCULAR; INTRAVENOUS; SUBCUTANEOUS ONCE
Status: DISCONTINUED | OUTPATIENT
Start: 2017-06-13 | End: 2017-06-15 | Stop reason: HOSPADM

## 2017-06-13 RX ORDER — OXYCODONE HYDROCHLORIDE 5 MG/1
5 TABLET ORAL EVERY 4 HOURS PRN
Status: DISCONTINUED | OUTPATIENT
Start: 2017-06-13 | End: 2017-06-15 | Stop reason: HOSPADM

## 2017-06-13 RX ORDER — ACETAMINOPHEN 325 MG/1
650 TABLET ORAL EVERY 4 HOURS PRN
Status: DISCONTINUED | OUTPATIENT
Start: 2017-06-13 | End: 2017-06-15 | Stop reason: HOSPADM

## 2017-06-13 RX ORDER — SODIUM CHLORIDE 9 MG/ML
INJECTION, SOLUTION INTRAVENOUS CONTINUOUS
Status: DISCONTINUED | OUTPATIENT
Start: 2017-06-13 | End: 2017-06-15

## 2017-06-13 RX ORDER — PRENATAL VIT/IRON FUM/FOLIC AC 27MG-0.8MG
1 TABLET ORAL DAILY
Status: DISCONTINUED | OUTPATIENT
Start: 2017-06-13 | End: 2017-06-15 | Stop reason: HOSPADM

## 2017-06-13 RX ORDER — NALOXONE HYDROCHLORIDE 0.4 MG/ML
.1-.4 INJECTION, SOLUTION INTRAMUSCULAR; INTRAVENOUS; SUBCUTANEOUS
Status: DISCONTINUED | OUTPATIENT
Start: 2017-06-13 | End: 2017-06-15 | Stop reason: HOSPADM

## 2017-06-13 RX ORDER — PROCHLORPERAZINE MALEATE 5 MG
5-10 TABLET ORAL EVERY 6 HOURS PRN
Status: DISCONTINUED | OUTPATIENT
Start: 2017-06-13 | End: 2017-06-15 | Stop reason: HOSPADM

## 2017-06-13 RX ORDER — PROCHLORPERAZINE 25 MG
25 SUPPOSITORY, RECTAL RECTAL EVERY 12 HOURS PRN
Status: DISCONTINUED | OUTPATIENT
Start: 2017-06-13 | End: 2017-06-15 | Stop reason: HOSPADM

## 2017-06-13 RX ORDER — AMOXICILLIN 250 MG
1-2 CAPSULE ORAL 2 TIMES DAILY
Status: DISCONTINUED | OUTPATIENT
Start: 2017-06-13 | End: 2017-06-15 | Stop reason: HOSPADM

## 2017-06-13 RX ORDER — ONDANSETRON 2 MG/ML
4 INJECTION INTRAMUSCULAR; INTRAVENOUS EVERY 6 HOURS PRN
Status: DISCONTINUED | OUTPATIENT
Start: 2017-06-13 | End: 2017-06-15 | Stop reason: HOSPADM

## 2017-06-13 RX ORDER — ONDANSETRON 4 MG/1
4 TABLET, ORALLY DISINTEGRATING ORAL EVERY 6 HOURS PRN
Status: DISCONTINUED | OUTPATIENT
Start: 2017-06-13 | End: 2017-06-15 | Stop reason: HOSPADM

## 2017-06-13 RX ADMIN — SENNOSIDES AND DOCUSATE SODIUM 1 TABLET: 8.6; 5 TABLET ORAL at 20:43

## 2017-06-13 RX ADMIN — SODIUM CHLORIDE 500 ML: 9 INJECTION, SOLUTION INTRAVENOUS at 17:31

## 2017-06-13 RX ADMIN — PRENATAL VIT W/ FE FUMARATE-FA TAB 27-0.8 MG 1 TABLET: 27-0.8 TAB at 17:38

## 2017-06-13 RX ADMIN — SODIUM CHLORIDE 1000 ML: 9 INJECTION, SOLUTION INTRAVENOUS at 17:38

## 2017-06-13 RX ADMIN — SODIUM CHLORIDE: 9 INJECTION, SOLUTION INTRAVENOUS at 17:33

## 2017-06-13 RX ADMIN — OXYCODONE HYDROCHLORIDE 5 MG: 5 TABLET ORAL at 20:43

## 2017-06-13 RX ADMIN — ACETAMINOPHEN 650 MG: 325 TABLET, FILM COATED ORAL at 20:43

## 2017-06-13 ASSESSMENT — ENCOUNTER SYMPTOMS
ABDOMINAL PAIN: 1
DYSURIA: 0
HEMATURIA: 0
CONSTIPATION: 0
DIARRHEA: 0
WOUND: 1
COLOR CHANGE: 0
FLANK PAIN: 1
FEVER: 0

## 2017-06-13 ASSESSMENT — PAIN DESCRIPTION - DESCRIPTORS: DESCRIPTORS: ACHING

## 2017-06-13 NOTE — H&P
[facility]    History and Physical  Hospitalist       Date of Admission:  2017  Date of Service (when I saw the patient): 17    Assessment & Plan   Leonor Reynoso is a 26 year old female who presents with flank pain and renal failure.     #Acute renal failure  --most likely pre-renal with decreased PO intake, possibly also contribution of recent NSAIDs for pain.  noncontrast CT with no hydronephrosis  PLAN:  1. IVF  2. Repeat BMP in AM  3. Urine lytes  4. Avoid NSAIDs    #Right flank pain  #Recent C section 2017  --no abnormalities on noncontrast CT.  No fever/chills or leukocytosis to suggest infection or complication of surgery.  Possibly abdominal wall pain.  +CVA tenderness on exam but UA not consistent with infection and no urinary symptoms.  LFTs unremarkable, lipase unremarkable.  Will consult OB for evaluation, continue PRN acetaminophen and oxycodone.      DVT Prophylaxis: ambulate  Code Status: Full Code    Disposition: Expected discharge in 1-2 days once renal function improved.    Anahi Lauren PA-C    Primary Care Physician   [facility]    Chief Complaint   Right flank pain    History is obtained from the patient and her significant other.     History of Present Illness   Leonor Reynoso is a 26 year old female who presents with right flank pain in setting of delivery by  section .  Reports right flank pain, no fevers, no dysuria since C section .  No residual jose-incisional pain.  Walking increases the pain.  Tylenol, ibuprofen, and oxycodone help the pain.  No urinary frequency.  Has nausea but no vomiting.  No diarrhea.  BM are normal, denies constipation.  Feels hungry but ambivalent about eating and has not eaten much in last 24-48H.  She presents today because the right flank pain she was experiencing has not subsided.      Past Medical History    No chronic medical conditions    Past Surgical History   C section  2017    Prior to Admission Medications   Prior to Admission Medications   Prescriptions Last Dose Informant Patient Reported? Taking?   Misc. Devices (BREAST PUMP) MISC   No No   Si each as needed   Prenatal Vit-Fe Fumarate-FA (PRENATAL MULTIVITAMIN  PLUS IRON) 27-0.8 MG TABS per tablet   Yes No   Sig: Take 1 tablet by mouth daily   acetaminophen (TYLENOL) 325 MG tablet   No No   Sig: Take 2 tablets (650 mg) by mouth every 4 hours as needed for mild pain   ibuprofen (ADVIL/MOTRIN) 600 MG tablet   No No   Sig: Take 1 tablet (600 mg) by mouth every 6 hours as needed for moderate pain   oxyCODONE (ROXICODONE) 5 MG IR tablet   No No   Sig: Take 1 tablet (5 mg) by mouth every 4 hours as needed for pain maximum 8 tablet(s) per day      Facility-Administered Medications: None     Allergies   No Known Allergies    Social History   I have reviewed this patient's social history and updated it with pertinent information if needed. Leonor Reynoso  reports that she has never smoked. She does not have any smokeless tobacco history on file. She reports that she does not drink alcohol or use illicit drugs.    Family History   Reviewed and noncontributory    Review of Systems   The 10 point Review of Systems is negative other than noted in the HPI or here.     Physical Exam   Temp: (P) 98.6  F (37  C) Temp src: (P) Oral BP: 125/73 Pulse: 97 Heart Rate: 97 Resp: (P) 15 SpO2: 97 % O2 Device: (P) None (Room air)    Vital Signs with Ranges  Temp:  [98.6  F (37  C)] (P) 98.6  F (37  C)  Pulse:  [97] 97  Heart Rate:  [97] 97  Resp:  [15] (P) 15  BP: (125)/(73) 125/73  SpO2:  [94 %-97 %] 97 %  0 lbs 0 oz    Constitutional: nontoxic appearing woman in no acute distress  Eyes: pupils equal and reactive  HEENT: mucous membranes somewhat dry  Respiratory: clear to auscultation bilaterally  Cardiovascular: RRR, no murmurs  GI: normoactive bowel sounds.  Abdomen soft, uterus is enlarged.  Diffuse tenderness to palpation without guarding  or rebound.  +CVA tenderness on the right.    Lymph/Hematologic: no abnormal bruising  Genitourinary: not assessed  Skin: pfannensteil incision clean, dry  Musculoskeletal: normal bulk and tone  Neurologic: grossly nonfocal  Psychiatric: alert and oriented to person, place, time    Data   Data reviewed today:  I personally reviewed no images or EKG's today.    Recent Labs  Lab 17  1158 17  0708 17  2100   WBC 8.9  --   --    HGB 11.6* 11.0* 12.7   MCV 96  --   --      --   --      --   --    POTASSIUM 4.2  --   --    CHLORIDE 109  --   --    CO2 24  --   --    BUN 27  --   --    CR 2.01*  --   --    ANIONGAP 9  --   --    ARIANA 7.8*  --   --    GLC 87  --   --    ALBUMIN 2.3*  --   --    PROTTOTAL 6.0*  --   --    BILITOTAL 0.4  --   --    ALKPHOS 139  --   --    ALT 22  --   --    AST 30  --   --    LIPASE 53*  --   --        Imaging:  Recent Results (from the past 24 hour(s))   Abd/pelvis CT no contrast - [facility] Protocol    Narrative    CT ABDOMEN AND PELVIS WITHOUT CONTRAST 2017 1:09 PM     HISTORY: Right flank/side pain, recent , elevated creatinine.    COMPARISON: None.    TECHNIQUE: Volumetric helical acquisition of CT images from the lung  bases through the symphysis pubis without intravenous contrast.  Radiation dose for this scan was reduced using automated exposure  control, adjustment of the mA and/or kV according to patient size, or  iterative reconstruction technique.    FINDINGS: Enlarged uterus compatible with recent . No  definite hydronephrosis. Trace free fluid. Scattered areas of  predominantly subcutaneous air compatible with recent surgery. The  liver, spleen, adrenal glands, kidneys, and pancreas demonstrate no  worrisome focal lesion in the absence of intravenous contrast. There  are no dilated loops of small intestine or large bowel to suggest  ileus or obstruction. Normal caliber aorta. Trace bilateral pleural  fluid. No pericardial  effusion. No acute infiltrates at the lung  bases. No frankly destructive bony lesions.      Impression    IMPRESSION: Postsurgical changes of  section, no acute process  or complication demonstrated.

## 2017-06-13 NOTE — ED NOTES
Delivered a baby last Thursday by . After that has had a lot of cramps in her abdomen. States she was provided medication to treat those symptoms while in the hospital and at the time of discharge. Pain has continued on the right side of her abdomen. Denies urinary symptoms or fevers.

## 2017-06-13 NOTE — ED NOTES
Brought pt in breast pump, pt needed no instruction.  at bedside. Call light at reach, bed low and locked.

## 2017-06-13 NOTE — IP AVS SNAPSHOT
MRN:[id number]                      After Visit Summary   6/13/2017    Leonor Reynoso    MRN: [id number]           Thank you!     Thank you for choosing [facility] for your care. Our goal is always to provide you with excellent care. Hearing back from our patients is one way we can continue to improve our services. Please take a few minutes to complete the written survey that you may receive in the mail after you visit. If you would like to speak to someone directly about your visit please contact Patient Relations at [phone]. Thank you!          Patient Information     Date Of Birth          1990        About your hospital stay     You were admitted on:  June 13, 2017 You last received care in the:  [address] [facility]    You were discharged on:  Halle 15, 2017        Reason for your hospital stay       You were admitted to the hospital for investigation of persistent right flank pain your work up did not show any problems with a kidney stone or infection  However the lab results showed that your kidney function had declined  Most likely from the effects of the toradol and motrin medications used to manage the discomfort from your recent C section  In some people these medications can effect blood flow to the kidney and cause acute kidney injury  This is improving and full recovery is expected  DO NOT TAKE THESE MEDICATIONS AGAIN  ( motrin, aleve, naprosyn, advil)                  Who to Call     For medical emergencies, please call 911.  For non-urgent questions about your medical care, please call your primary care provider or clinic, [phone]          Attending Provider     Provider Specialty    Ada Schultz MD Emergency Medicine    Moi Gardner MD Internal Medicine       Primary Care Provider Office Phone # Fax #    [facility] [phone]      After Care Instructions     Activity       Your activity upon  "discharge: as before and as tolerated            Diet       Follow this diet upon discharge:regular  Drink 6 to 8 glasses of liquids a day                  Follow-up Appointments     Follow-up and recommended labs and tests        Follow up with OB as arranged  And get a metabolic profile checked in 1 to 2 weeks                  Pending Results     No orders found from 2017 to 2017.            Statement of Approval     Ordered          06/15/17 1435  I have reviewed and agree with all the recommendations and orders detailed in this document.  EFFECTIVE NOW     Approved and electronically signed by:  Penny Mccall MD             Admission Information     Date & Time Provider Department Dept. Phone    2017 Moi Gardner MD [place] [address] [facility] [phone]      Your Vitals Were     Blood Pressure Pulse Temperature Respirations Height Weight    156/87 (BP Location: Left arm) 97 99.1  F (37.3  C) (Oral) 18 1.626 m (5' 4\") 80.5 kg (177 lb 8 oz)    Last Period Pulse Oximetry BMI (Body Mass Index)             2016 98% 30.47 kg/m2         [employer] Information     Got[employer] Personal Care Device lets you send messages to your doctor, view your test results, renew your prescriptions, schedule appointments and more. To sign up, go to [email or web address] . Click on \"Log in\" on the left side of the screen, which will take you to the Welcome page. Then click on \"Sign up Now\" on the right side of the page.     You will be asked to enter the access code listed below, as well as some personal information. Please follow the directions to create your username and password.     Your access code is: [email or web address]  Expires: 2017 11:19 AM     Your access code will  in 90 days. If you need help or a new code, please call your [place] clinic or [phone].        Care EveryWhere ID     This is your Care EveryWhere ID. This could be used by other organizations to access your [place] medical " records  [id number]           Review of your medicines      CONTINUE these medicines which have NOT CHANGED        Dose / Directions    acetaminophen 325 MG tablet   Commonly known as:  TYLENOL   Used for:  S/P  section        Dose:  650 mg   Take 2 tablets (650 mg) by mouth every 4 hours as needed for mild pain   Quantity:  100 tablet   Refills:  0       breast pump Misc   Used for:  S/P  section        Dose:  1 each   1 each as needed   Quantity:  1 each   Refills:  0       oxyCODONE 5 MG IR tablet   Commonly known as:  ROXICODONE   Used for:  S/P  section        Dose:  5 mg   Take 1 tablet (5 mg) by mouth every 4 hours as needed for pain maximum 8 tablet(s) per day   Quantity:  20 tablet   Refills:  0       prenatal multivitamin  plus iron 27-0.8 MG Tabs per tablet   Indication:  Pregnancy        Dose:  1 tablet   Take 1 tablet by mouth daily   Refills:  0         STOP taking     ibuprofen 600 MG tablet   Commonly known as:  ADVIL/MOTRIN                    Protect others around you: Learn how to safely use, store and throw away your medicines at www.disposemymeds.org.             Medication List: This is a list of all your medications and when to take them. Check marks below indicate your daily home schedule. Keep this list as a reference.      Medications           Morning Afternoon Evening Bedtime As Needed    acetaminophen 325 MG tablet   Commonly known as:  TYLENOL   Take 2 tablets (650 mg) by mouth every 4 hours as needed for mild pain   Last time this was given:  650 mg on 6/15/2017  3:04 AM                                breast pump Misc   1 each as needed                                oxyCODONE 5 MG IR tablet   Commonly known as:  ROXICODONE   Take 1 tablet (5 mg) by mouth every 4 hours as needed for pain maximum 8 tablet(s) per day   Last time this was given:  5 mg on 2017  3:43 AM                                prenatal multivitamin  plus iron 27-0.8 MG Tabs per tablet    Take 1 tablet by mouth daily   Last time this was given:  1 tablet on 6/15/2017  8:53 AM

## 2017-06-13 NOTE — CONSULTS
June 13, 2017    Leonor Reynoso  [id number]            OB/ GYN Consult      Ms. Reynoso  is here for acute renal failure post-C/S.    I was asked to see Pt by Dr. Gardner of Hospitalist service as she has recently undergone C/S and to follow Pt during hospitalization.    I communicated with Pt via her  who acts as  because Pt speaks no/limited English.    Pt was admitted for acute renal failure.  Pt is currently POD# 5 s/p primary LTCS for arrest of dilation after induction at 41w1d by Dr. Engel.  Op note reviewed and C/S appears to have been uncomplicated.  Pregnancy was complicated by polyhydramnios of unknown etiology.  She had been given Cervidil vaginally overnight prior to induction.  Her postop course was uncomplicated.  It appears Pt did have normal postop diuresis and [place] was subsequently removed as usual on POD#1.  Her preop Hgb was 12.7, and postop was 11.0.  There was no notable hypotensive episodes preop, intraop, or postop.  She went home on POD#3 as usual.  However, Pt states that ever since her C/S, even while in the hospital, she had right flank pain.  It was attributed to her recent postop state and likely normal post-labor and post-C/S discomfort.  However it persisted and finally today at her baby's Peds appt she thought it was worsening so she came to ER.  In the [facility] ER her CBC was normal (Hgb 11.6), Lytes all normal except her Creatinine was 2.01. UA showed mod blood, 3 WBCs, 1 RBC, Few Bact, <1 Squam Epi.  A CT Abd/Pelvis (w/o contrast) showed normal enlarged PP uterus, normal kidneys bilaterally with no hydronephrosis, and all other findings were normal for postop state as well.  No dysuria.  Some decreased appetite but she is eating and tolerating po well.  Slight nausea but no vomiting, no constipation nor diarrhea.    Review of her postop meds after surgery, she received the usual regimen of Toradol 30 mg IV Q 6 hours x 4 doses, followed by ibuprofen 800 mg po  "Q 6 hours for 6 doses after that.  At home, she has been taking ibuprofen 600 mg po Q 6 hours with Tylenol and Oxycodone as well.    Patient's last menstrual period was 2016.     Estimated body mass index is 30.14 kg/(m^2) as calculated from the following:    Height as of this encounter: 1.626 m (5' 4\").    Weight as of this encounter: 79.7 kg (175 lb 9.6 oz).      She is a 26 year old   Her OB history:   Obstetric History       T1      L1     SAB0   TAB0   Ectopic0   Multiple0   Live Births1       # Outcome Date GA Lbr Lobito/2nd Weight Sex Delivery Anes PTL Lv   2 Term 17 41w1d  4.44 kg (9 lb 12.6 oz) M CS-LTranv EPI N STEPHEN      Name: ERVIN CUENCA EMAN      Complications: Fetal Intolerance,Failure to Progress in First Stage      Apgar1:  8                Apgar5: 9   1 SAB 2016     SAB                History reviewed. No pertinent past medical history.       Past Surgical History:   Procedure Laterality Date      SECTION N/A 2017    Procedure:  SECTION;   section;  Surgeon: Dane Engel MD;  Location:  L+D     GYN SURGERY           No current outpatient prescriptions on file.       Allergies: Review of patient's allergies indicates no known allergies.      REVIEW OF SYSTEMS:  NEUROLOGIC:  Negative  EYES:  Negative  ENT:  Negative  GI:  Negative  :  As above  GYN:  As above  CV:  Negative  PULMONARY:  Negative  MUSCULOSKELETAL:  Negative  PSYCH:  Negative        Social History     Social History     Marital status:      Spouse name: N/A     Number of children: N/A     Years of education: N/A     Occupational History     Not on file.     Social History Main Topics     Smoking status: Never Smoker     Smokeless tobacco: Not on file     Alcohol use No     Drug use: No     Sexual activity: Yes     Partners: Male     Other Topics Concern     Not on file     Social History Narrative      No family history on file.      Exam:    Vitals:   BP " "117/59 (BP Location: Left arm)  Pulse 97  Temp 98  F (36.7  C) (Oral)  Resp 20  Ht 1.626 m (5' 4\")  Wt 79.7 kg (175 lb 9.6 oz)  LMP 03/07/2016  SpO2 97%  Breastfeeding? Yes  BMI 30.14 kg/m2    Gen: Alert Awake in NAD  HEENT: grossly normal  Neck: no lymphadenopathy or thryoidomegaly  Lungs: CTAB  Back: No CVAT  Heart: RR  ABD: ND, NABS, soft, NT, with NT fundus palpable below umbilicus, no CVAT  Incision:  Healing well w/o erythema, induration, or drainage  EXT:  No edema, no calf tenderness    Labs and CT reviewed in Epic.    Assessment:    1)  POD#5 s/p primary LTCS - incision healing well, bowel function normal.    2)  Acute renal failure - unknown etiology as of now, undergoing evaluation and Rx per Hospitalist service.      Plan:    1)  Rx plan per Hospitalist service.    2)  Given her normal CT, and the nature of a LTCS that didn't require any unusual measures intraop to control bleeding, etc, there doesn't appear to be any evidence of ureteral injury.    3)  Will follow Pt while in the hospital.        rBenden Wayne MD  Dept of OB/GYN  June 13, 2017     "

## 2017-06-13 NOTE — IP AVS SNAPSHOT
[place] [address] [facility]    [address]    [facility] [address]    Phone:  [phone]    Fax:  [phone]                                       After Visit Summary   6/13/2017    Leonor Reynoso    MRN: [id number]           After Visit Summary Signature Page     I have received my discharge instructions, and my questions have been answered. I have discussed any challenges I see with this plan with the nurse or doctor.    ..........................................................................................................................................  Patient/Patient Representative Signature      ..........................................................................................................................................  Patient Representative Print Name and Relationship to Patient    ..................................................               ................................................  Date                                            Time    ..........................................................................................................................................  Reviewed by Signature/Title    ...................................................              ..............................................  Date                                                            Time

## 2017-06-13 NOTE — ED PROVIDER NOTES
History     Chief Complaint:  Right abdominal/flank pain     HPI   Leonor Reynoso is a 26 year old female who is 5 days postpartum s/p  who presents for evaluation of R side/flank pain. She has been having right abdominal/flank pain since the C section, which was treated with analgesics, and it has stayed the same since. It worsens when she walks. She denies pain or skin changes around her incision site. She was discharged 2 days ago on oxycodone, and directed to use ibuprofen and tylenol as well.  She denies fever, hematuria or dysuria. No changes in BMs. Lochia is not foul smelling and has been decreasing since birth. Next ObGyn appointment is scheduled for 6 weeks from now. No other concerns or complaints at this time.     Allergies:  No Known Allergies     Medications:    The patient is currently on no regular medications.     Past Medical History:    History reviewed.  No significant past medical history.      Past Surgical History:     2017    Family History:    History is non-contributory.     Social History:  Marital Status:   [2]  Smoking status: negative   Alcohol status:  Negative   Patient presents with her .  PCP: [facility], [place]      Review of Systems   Constitutional: Negative for fever.   Gastrointestinal: Positive for abdominal pain. Negative for constipation and diarrhea.   Genitourinary: Positive for flank pain and vaginal bleeding. Negative for dysuria, hematuria and vaginal discharge.   Skin: Positive for wound. Negative for color change.   All other systems reviewed and are negative.      Physical Exam   First Vitals:  /73  Pulse 97  Temp (P) 98.6  F (37  C) (Oral)  Resp (P) 15  LMP 2016  SpO2 97%  Breastfeeding? Yes        Physical Exam  Constitutional: The patient is oriented to person, place, and time. Alert and cooperative.  HENT:   Right Ear: External ear normal.   Left Ear: External ear normal.   Nose: Nose normal.    Mouth/Throat: Uvula is midline, oropharynx is clear and moist and mucous membranes are normal. No posterior oropharyngeal edema or erythema.   Eyes: Conjunctivae, EOM and lids are normal. Pupils are equal, round, and reactive to light.   Neck: Trachea normal. Normal range of motion. Neck supple.   Cardiovascular: Normal rate, regular rhythm, normal heart sounds, and intact distal pulses.    Pulmonary/Chest: Effort normal and breath sounds equal bilaterally. No crackles or wheezing.   Abdominal: Soft. Mild tenderness to palpation along the low abdomen as well as in the R side/flank. No rebound and no guarding. Incision site c/d/i. No surrounding erythema or purulent discharge.  Musculoskeletal: Normal range of motion.  No extremity tenderness or edema.  Neurological: Alert and Oriented. Strength 5/5 in upper and lower extremities bilaterally. Sensation intact to light touch throughout.  Skin: Skin is dry. No rash noted.          Emergency Department Course     Imaging:  Radiology findings were communicated with the patient who voiced understanding of the findings.    CT Abdomen and Pelvis, without contrast (stone protocol), per radiology:    Postsurgical changes of  section, no acute process or complication demonstrated.      Laboratory:  Laboratory findings were communicated with the patient who voiced understanding of the findings.    CBC: WBC 8.9, HGB 11.6,   CMP: Creatinine 2.01, GFR 30, Ca 7.8, Alb 2.3  Lipase: 53    UA: Blood moderate, Nitrite negative, LE trace, WBC 3, RBC 1, SEC <1, Bacteria few     Interventions:  1204, Dilaudid 0.5 mg, IV      Emergency Department Course:  Nursing notes and vitals reviewed.  I performed an exam of the patient as documented above.   The patient was placed on continuous pulse oximetry and cardiac monitoring.    A peripheral IV was established and blood was drawn for laboratory testing, results above.  CT abdomen/pelvis obtained while in the emergency  department, findings above.     1345, discussed case with Mega Gil.  1414, discussed case with ALMA Hernandez admitting for Dr. Gardner, hospitalist service.    I rechecked the patient and the findings were explained to her, who consents to admission. Dr. Gardner will admit the patient for further monitoring, evaluation and treatment.      Impression & Plan      Medical Decision Making:  Leonor Reynoso is a 26 year old female who is 5 days s/p  who presents to the emergency department for evaluation of right-sided flank pain. Upon presentation in the ED, the patient is non-toxic appearing. Vitals are within normal limits and stable. On exam, she is well appearing. The patient is alert, oriented, and neurologic exam is non focal. Cardiopulmonary exam is unremarkable. On abdominal exam, she does endorse diffuse lower abdominal tenderness to palpation as well as tenderness to palpation of the right flank. The incision site is clean, dry and intact. There is no purulent discharge. The rest of the exam is as mentioned above.       Labs were obtained and are as mentioned above. Notably, she does not have a leukocytosis and her creatinine is elevated at 2.01. The patient's most recent creatinine on record was from 1 year ago and was normal at that time. CT of the abdomen was obtained and demonstrates postsurgical changes, but no abscess, acute process or complication. These results were dicussed with the patient and she notes understanding.     The patient was then discussed with Dr. Wayne of ObGyn. He notes that given the unremarkable CT, he does not feel this is likely related to a complication from the surgical procedure. However, he does recommend the patient be admitted for further evaluation as to why she does have an SIMON. The patient was then discussed with the hospitalist, who agreed to accept the patient. She was stable / improved at time of admission.     Diagnosis:    ICD-10-CM   1.  Rt flank pain R10.9   2. Acute kidney injury (H) N17.9       Disposition:   Med/Surg bed     Scribe Disclosure:  I, Katy Carmona, am serving as a scribe at 11:26 AM on 6/13/2017 to document services personally performed by Ada Schultz MD, based on my observations and the provider's statements to me.    [facility] EMERGENCY DEPARTMENT       Ada Schultz MD  06/14/17 8989

## 2017-06-13 NOTE — PHARMACY-ADMISSION MEDICATION HISTORY
Admission medication history interview status for this patient is complete. See [facility] admission navigator for allergy information, prior to admission medications and immunization status.     Medication history interview source(s):Patient and Family  Medication history resources (including written lists, pill bottles, clinic record):None  Primary pharmacy:[facility]    Changes made to PTA medication list:  Added: none  Deleted: none  Changed: none    Actions taken by pharmacist (provider contacted, etc):None     Additional medication history information:None    Medication reconciliation/reorder completed by provider prior to medication history? No    Do you take OTC medications (eg tylenol, ibuprofen, fish oil, eye/ear drops, etc)? N(Y/N)    For patients on insulin therapy: N (Y/N)  Lantus/levemir/NPH/Mix 70/30 dose:   (Y/N) (see below)   Sliding scale Novolog Y/N  If Yes, do you have a baseline novolog pre-meal dose:  units with meals   Patients eat three meals a day:   Y/N     Any Barriers to therapy:  cost of medications/comfortable with giving injections (if applicable)/ comfortable and confident with current diabetes regimen:       Prior to Admission medications    Medication Sig Last Dose Taking? Auth Provider   ibuprofen (ADVIL/MOTRIN) 600 MG tablet Take 1 tablet (600 mg) by mouth every 6 hours as needed for moderate pain 6/13/2017 at Unknown time Yes Kun Kelly MD   acetaminophen (TYLENOL) 325 MG tablet Take 2 tablets (650 mg) by mouth every 4 hours as needed for mild pain 6/13/2017 at Unknown time Yes Kun Kelly MD   oxyCODONE (ROXICODONE) 5 MG IR tablet Take 1 tablet (5 mg) by mouth every 4 hours as needed for pain maximum 8 tablet(s) per day 6/13/2017 at Unknown time Yes Kun Kelly MD   Prenatal Vit-Fe Fumarate-FA (PRENATAL MULTIVITAMIN  PLUS IRON) 27-0.8 MG TABS per tablet Take 1 tablet by mouth daily 6/12/2017 at Unknown time Yes Reported, Patient   Misc.  Devices (BREAST PUMP) MISC 1 each as needed   Kun Kelly MD

## 2017-06-13 NOTE — ED NOTES
[facility]  ED Nurse Handoff Report    Leonor Reynoso is a 26 year old female   ED Chief complaint: Abdominal Pain  . ED Diagnosis:   Final diagnoses:   Rt flank pain   Acute kidney injury (H)     Allergies: No Known Allergies    Code Status: Full Code  Activity level - Baseline/Home:  Independent. Activity Level - Current:   Independent. Lift room needed: No. Bariatric: No   Needed: Yes   Isolation: No. Infection: Not Applicable.     Vital Signs:   Vitals:    06/13/17 1124 06/13/17 1125 06/13/17 1126 06/13/17 1130   BP:       Pulse:       Resp:    (P) 15   Temp:    (P) 98.6  F (37  C)   TempSrc:    (P) Oral   SpO2: 94% 97% 97%    /73 HR 97    Cardiac Rhythm:  ,      Pain level:    Patient confused: No. Patient Falls Risk: Yes.   Elimination Status: Has voided   Patient Report / Focused Assessment:    OB/GYN - Female Reproductive Comment: pt comes in with right sided flank pain since having c section on 6/8/17. pain is not where incision is but by kidney.  Tests Performed / Abnormal Results:         Results for LEONOR REYNSOO (MRN [id number]) as of 6/13/2017 14:26   Ref. Range 6/13/2017 11:58 6/13/2017 13:09   Sodium Latest Ref Range: 133 - 144 mmol/L 142    Potassium Latest Ref Range: 3.4 - 5.3 mmol/L 4.2    Chloride Latest Ref Range: 94 - 109 mmol/L 109    Carbon Dioxide Latest Ref Range: 20 - 32 mmol/L 24    Urea Nitrogen Latest Ref Range: 7 - 30 mg/dL 27    Creatinine Latest Ref Range: 0.52 - 1.04 mg/dL 2.01 (H)    GFR Estimate Latest Ref Range: >60 mL/min/1.7m2 30 (L)    GFR Estimate If Black Latest Ref Range: >60 mL/min/1.7m2 37 (L)    Calcium Latest Ref Range: 8.5 - 10.1 mg/dL 7.8 (L)    Anion Gap Latest Ref Range: 3 - 14 mmol/L 9    Albumin Latest Ref Range: 3.4 - 5.0 g/dL 2.3 (L)    Protein Total Latest Ref Range: 6.8 - 8.8 g/dL 6.0 (L)    Bilirubin Total Latest Ref Range: 0.2 - 1.3 mg/dL 0.4    Alkaline Phosphatase Latest Ref Range: 40 - 150 U/L 139    ALT Latest Ref  Range: 0 - 50 U/L 22    AST Latest Ref Range: 0 - 45 U/L 30    Lipase Latest Ref Range: 73 - 393 U/L 53 (L)    Glucose Latest Ref Range: 70 - 99 mg/dL 87    WBC Latest Ref Range: 4.0 - 11.0 10e9/L 8.9    Hemoglobin Latest Ref Range: 11.7 - 15.7 g/dL 11.6 (L)    Hematocrit Latest Ref Range: 35.0 - 47.0 % 33.6 (L)    Platelet Count Latest Ref Range: 150 - 450 10e9/L 223    RBC Count Latest Ref Range: 3.8 - 5.2 10e12/L 3.52 (L)    MCV Latest Ref Range: 78 - 100 fl 96    MCH Latest Ref Range: 26.5 - 33.0 pg 33.0    MCHC Latest Ref Range: 31.5 - 36.5 g/dL 34.5    RDW Latest Ref Range: 10.0 - 15.0 % 12.5    Diff Method Unknown Automated Method    % Neutrophils Latest Units: % 77.0    % Lymphocytes Latest Units: % 15.1    % Monocytes Latest Units: % 5.1    % Eosinophils Latest Units: % 2.0    % Basophils Latest Units: % 0.2    % Immature Granulocytes Latest Units: % 0.6    Nucleated RBCs Latest Ref Range: 0 /100 0    Absolute Neutrophil Latest Ref Range: 1.6 - 8.3 10e9/L 6.8    Absolute Lymphocytes Latest Ref Range: 0.8 - 5.3 10e9/L 1.3    Absolute Monocytes Latest Ref Range: 0.0 - 1.3 10e9/L 0.5    Absolute Eosinophils Latest Ref Range: 0.0 - 0.7 10e9/L 0.2    Absolute Basophils Latest Ref Range: 0.0 - 0.2 10e9/L 0.0    Abs Immature Granulocytes Latest Ref Range: 0 - 0.4 10e9/L 0.1    Absolute Nucleated RBC Unknown 0.0    Color Urine Unknown Straw    Appearance Urine Unknown Clear    Glucose Urine Latest Ref Range: NEG mg/dL Negative    Bilirubin Urine Latest Ref Range: NEG  Negative    Ketones Urine Latest Ref Range: NEG mg/dL Negative    Specific Gravity Urine Latest Ref Range: 1.003 - 1.035  1.004    pH Urine Latest Ref Range: 5.0 - 7.0 pH 6.0    Protein Albumin Urine Latest Ref Range: NEG mg/dL Negative    Urobilinogen mg/dL Latest Ref Range: 0.0 - 2.0 mg/dL 0.0    Nitrite Urine Latest Ref Range: NEG  Negative    Blood Urine Latest Ref Range: NEG  Moderate (A)    Leukocyte Esterase Urine Latest Ref Range: NEG  Trace  (A)    Source Unknown Midstream Urine    WBC Urine Latest Ref Range: 0 - 2 /HPF 3 (H)    RBC Urine Latest Ref Range: 0 - 2 /HPF 1    Bacteria Urine Latest Ref Range: NEG /HPF Few (A)    Squamous Epithelial /HPF Urine Latest Ref Range: 0 - 1 /HPF <1    CT ABDOMEN PELVIS W/O CONTRAST Unknown  Rpt           Treatments provided:   Family Comments:  AT BEDSIDE  OBS brochure/video discussed/provided to patient:  N/A  ED Medications:   Medications   0.9% sodium chloride BOLUS (not administered)   HYDROmorphone (PF) (DILAUDID) injection 0.5 mg (0.5 mg Intravenous Not Given 6/13/17 1204)     Drips infusing:  No         ED Nurse Name/Phone Number: Yesenia Heckvikram,   2:21 PM    RECEIVING UNIT ED HANDOFF REVIEW    Above ED Nurse Handoff Report was reviewed: Yes  Reviewed by: Simin Mistry on June 13, 2017 at 4:16 PM

## 2017-06-14 ENCOUNTER — APPOINTMENT (OUTPATIENT)
Dept: ULTRASOUND IMAGING | Facility: CLINIC | Age: 27
End: 2017-06-14
Attending: INTERNAL MEDICINE
Payer: COMMERCIAL

## 2017-06-14 LAB
ANION GAP SERPL CALCULATED.3IONS-SCNC: 9 MMOL/L (ref 3–14)
BUN SERPL-MCNC: 35 MG/DL (ref 7–30)
CALCIUM SERPL-MCNC: 8.2 MG/DL (ref 8.5–10.1)
CHLORIDE SERPL-SCNC: 112 MMOL/L (ref 94–109)
CO2 SERPL-SCNC: 22 MMOL/L (ref 20–32)
CREAT SERPL-MCNC: 1.94 MG/DL (ref 0.52–1.04)
CREAT SERPL-MCNC: 1.95 MG/DL (ref 0.52–1.04)
CREAT UR-MCNC: 15 MG/DL
CREAT UR-MCNC: 15 MG/DL
CREAT UR-MCNC: 32 MG/DL
FRACT EXCRET NA UR+SERPL-RTO: 6.2 %
GFR SERPL CREATININE-BSD FRML MDRD: 31 ML/MIN/1.7M2
GFR SERPL CREATININE-BSD FRML MDRD: 32 ML/MIN/1.7M2
GLUCOSE SERPL-MCNC: 76 MG/DL (ref 70–99)
POTASSIUM SERPL-SCNC: 4.2 MMOL/L (ref 3.4–5.3)
PROT UR-MCNC: 0.22 G/L
PROT/CREAT 24H UR: 1.46 G/G CR (ref 0–0.2)
SODIUM SERPL-SCNC: 143 MMOL/L (ref 133–144)
SODIUM SERPL-SCNC: 144 MMOL/L (ref 133–144)
SODIUM UR-SCNC: 43 MMOL/L
SODIUM UR-SCNC: 68 MMOL/L

## 2017-06-14 PROCEDURE — 82565 ASSAY OF CREATININE: CPT | Performed by: INTERNAL MEDICINE

## 2017-06-14 PROCEDURE — 12000000 ZZH R&B MED SURG/OB

## 2017-06-14 PROCEDURE — 36415 COLL VENOUS BLD VENIPUNCTURE: CPT | Performed by: INTERNAL MEDICINE

## 2017-06-14 PROCEDURE — 99232 SBSQ HOSP IP/OBS MODERATE 35: CPT | Performed by: INTERNAL MEDICINE

## 2017-06-14 PROCEDURE — 99207 ZZC CDG-MDM COMPONENT: MEETS MODERATE - DOWN CODED: CPT | Performed by: INTERNAL MEDICINE

## 2017-06-14 PROCEDURE — 76770 US EXAM ABDO BACK WALL COMP: CPT

## 2017-06-14 PROCEDURE — 80048 BASIC METABOLIC PNL TOTAL CA: CPT | Performed by: PHYSICIAN ASSISTANT

## 2017-06-14 PROCEDURE — 82570 ASSAY OF URINE CREATININE: CPT | Performed by: INTERNAL MEDICINE

## 2017-06-14 PROCEDURE — 25000132 ZZH RX MED GY IP 250 OP 250 PS 637: Performed by: PHYSICIAN ASSISTANT

## 2017-06-14 PROCEDURE — 84300 ASSAY OF URINE SODIUM: CPT | Performed by: INTERNAL MEDICINE

## 2017-06-14 PROCEDURE — 36415 COLL VENOUS BLD VENIPUNCTURE: CPT | Performed by: PHYSICIAN ASSISTANT

## 2017-06-14 PROCEDURE — 25000128 H RX IP 250 OP 636: Performed by: PHYSICIAN ASSISTANT

## 2017-06-14 PROCEDURE — 84156 ASSAY OF PROTEIN URINE: CPT | Performed by: INTERNAL MEDICINE

## 2017-06-14 PROCEDURE — 84295 ASSAY OF SERUM SODIUM: CPT | Performed by: INTERNAL MEDICINE

## 2017-06-14 RX ADMIN — SENNOSIDES AND DOCUSATE SODIUM 1 TABLET: 8.6; 5 TABLET ORAL at 08:47

## 2017-06-14 RX ADMIN — ACETAMINOPHEN 650 MG: 325 TABLET, FILM COATED ORAL at 03:43

## 2017-06-14 RX ADMIN — OXYCODONE HYDROCHLORIDE 5 MG: 5 TABLET ORAL at 03:43

## 2017-06-14 RX ADMIN — PRENATAL VIT W/ FE FUMARATE-FA TAB 27-0.8 MG 1 TABLET: 27-0.8 TAB at 08:46

## 2017-06-14 RX ADMIN — ACETAMINOPHEN 650 MG: 325 TABLET, FILM COATED ORAL at 15:02

## 2017-06-14 RX ADMIN — SODIUM CHLORIDE: 9 INJECTION, SOLUTION INTRAVENOUS at 11:09

## 2017-06-14 RX ADMIN — SENNOSIDES AND DOCUSATE SODIUM 1 TABLET: 8.6; 5 TABLET ORAL at 20:25

## 2017-06-14 RX ADMIN — SODIUM CHLORIDE: 9 INJECTION, SOLUTION INTRAVENOUS at 03:38

## 2017-06-14 ASSESSMENT — PAIN DESCRIPTION - DESCRIPTORS: DESCRIPTORS: CONSTANT

## 2017-06-14 NOTE — CONSULTS
RENAL CONSULTATION NOTE    REFERRING MD:  Moi Gardner MD    REASON FOR CONSULTATION:  SIMON    HPI:  26 y.o woman without any significant medical history except for  on , who was re-admitted on  for R flank pain. Patient delivered a healthy baby boy via  on . She was discharged on , and she was re-admitted on Tuesday. Pt has had R flank pain since after the . She does not have pain if she sits or lays still. She has right flank pain with movement and with palpitation. The pain does not radiate. She says she was taking one tab of tylenol, one tab of oxycodone and one tab of ibuprofen daily for pain. She did not take any other mediations, OTC or herbals. She says her appetites has been poor. She denies fever and chill. She denies dysuria or gross hematuria. Her bilateral lower extremities edema is just a little worse since the delivery. She denies rash or bruises. She denies nausea or vomiting. She denies vaginal discharge. The  scar is healing well without discharge. She does not have any cardiopulmonary complaints.     ROS:  A complete review of systems was performed and is negative except as noted above.    PMH:  History reviewed. No pertinent past medical history.    PSH:    Past Surgical History:   Procedure Laterality Date      SECTION N/A 2017    Procedure:  SECTION;   section;  Surgeon: Dane Engel MD;  Location:  L+D     GYN SURGERY         MEDICATIONS:      HYDROmorphone  0.5 mg Intravenous Once     prenatal multivitamin  plus iron  1 tablet Oral Daily     senna-docusate  1-2 tablet Oral BID       ALLERGIES:    Allergies as of 2017     (No Known Allergies)       FH:  No family history on file.    SH:    Social History     Social History     Marital status:      Spouse name: N/A     Number of children: N/A     Years of education: N/A     Occupational History     Not on file.     Social History Main  "Topics     Smoking status: Never Smoker     Smokeless tobacco: Not on file     Alcohol use No     Drug use: No     Sexual activity: Yes     Partners: Male     Other Topics Concern     Not on file     Social History Narrative       PHYSICAL EXAM:    /70 (BP Location: Left arm)  Pulse 97  Temp 98.8  F (37.1  C) (Oral)  Resp 16  Ht 1.626 m (5' 4\")  Wt 79.7 kg (175 lb 9.6 oz)  LMP 2016  SpO2 98%  Breastfeeding? Yes  BMI 30.14 kg/m2  GENERAL: Quiet. NAD.  HEENT:  Normocephalic. No gross abnormalities.  Pupils equal.  MMM.  Dentition is ok.  CV: RRR, no murmurs, no clicks, gallops, or rubs, 2+ edema, no carotid bruits  RESP: Clear bilaterally with good efforts. No wheezes or crackles.  GI: Abdomen distended, soft, + tenderness R lower quadrant/side. No CV tenderness.  MUSCULOSKELETAL: extremities nl - no gross deformities noted. 2+ edema  SKIN: no suspicious lesions or rashes, dry to touch  NEURO:  Strength normal and symmetric. A/o x3.  PSYCH: mood good, affect appropriate  LYMPH: No palpable ant/post cervical    LABS:      CBC RESULTS:     Recent Labs  Lab 17  1158 17  0708 17  2100   WBC 8.9  --   --    RBC 3.52*  --   --    HGB 11.6* 11.0* 12.7   HCT 33.6*  --   --      --   --        BMP RESULTS:    Recent Labs  Lab 17  0656 17  1158    142   POTASSIUM 4.2 4.2   CHLORIDE 112* 109   CO2 22 24   BUN 35* 27   CR 1.94* 2.01*   GLC 76 87   ARIANA 8.2* 7.8*       INRNo lab results found in last 7 days.     DIAGNOSTICS:  Reviewed    CT without contrast: no acute abnormalities to explain the pain  Renal ultrasound: no mass, hydro, cyst or stone. Both kidneys look isogenic and normal. I personally reviewed the images    A/P:  25 yo woman s/p delivery via a , re-admitted for R lower quadrant/side pain, consulted for SIMON.     1. Patient had a SCr of 0.47 mg/dl on 6/10.     2. She was re-admitted on  with a Scr of 2 mg/dl and it is 1.9 mg/dl today. She was " only taking tab of ibuprofen at home.     3. S/P  on .    4. R lower quadrant/side pain. CT without contrast and renal ultrasound are unremarkable.     Plan.  1. Check FENa  2. Check UPCR  3. Okay to stop IVF after this bag is done  4. Renal panel daily  5. Avoid NSAIDs    Bill Burnett MD  [facility] - Nephrology  Office Phone: [phone]  Pager: [phone]

## 2017-06-14 NOTE — PROGRESS NOTES
End of Shift Summary.  For vital signs and complete assessments, please see documentation flowsheets.     Pertinent assessments: Alert and oriented x 4, pain controlled with prn oxycodone and tylenol. Tolerating IV hydration. Incision is clean, dry, and intact. No noted bleeding. CMS intact. Denies nausea, dizziness or other complaints.   Major Shift Events:  Breast pump provided.  Plan (Upcoming Events): Continue to hydrate with IVF, NO NSAIDS, monitor kidney function.   Discharge/Transfer Needs: TBD

## 2017-06-14 NOTE — PROGRESS NOTES
OB/GYN PN    25 yo P1 S/P C/S on  who had an uneventful PP course and was discharged .  She was readmitted  with right flank pain and founfd to have elevated CR c/w SIMON.    CT of Abd/pelvis    -  Post  Section changes noted, no acute process or complication identified.  Renal U/S - normal bilaterally    Cr 2.01 om admission, slightly improved to 1.95 presently.    Nephrology Consult reviewed.    VSS,AF  Patient states flank pain is improving    Abdomen soft, NT  Pfannenstiel scar CDI  Ext NT    A/P:  SIMON of uncertain etiology but imaging does not suggests post surgical complication           Nephrology following           Will continue to follow    Naresh Kelly MD  Beeper [phone]

## 2017-06-14 NOTE — PLAN OF CARE
End of shift note note; Pt ambulates independently. Denies any need for pain medication throughout 7-3pm shift. BLE edema +3, no change. Pt had US kidney today and Nephrology consult. Denies any further needs for breastfeeding. Breast pump at bedside.

## 2017-06-14 NOTE — PROGRESS NOTES
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 SIMON -non oliguric ,etiology unclear. FENA 6.2  --likely multifactorial -decreased intake ,NSAID   -- continue hydration, US kidneys   -- nephrology consult     2. Postpartum status   --OB/GYN following     3. Right flank pain   --Pain med for now , imaging studies unremarkable for etiology         DVT Prophylaxis: Ambulate every shift    Code Status: Full Code    Likely Discharge Dispo: home in 1-2 days if renal function improves           Interval History (Subjective):             Patient was seen and examined by me today and medical record reviewed.Overnight events noted and care discussed with nursing staff and treatment team.    Has right flank pain but denies fever or chills   No n/v                      Physical Exam:      Last Vital Signs:  /68 (BP Location: Left arm)  Pulse 97  Temp 99.3  F (37.4  C) (Oral)  Resp 18  Ht 1.626 m (5' 4\")  Wt 79.7 kg (175 lb 9.6 oz)  LMP 2016  SpO2 96%  Breastfeeding? Yes  BMI 30.14 kg/m2    I/O last 3 completed shifts:  In: 3331 [P.O.:1160; I.V.:2171]  Out: 800 [Urine:800]    Constitutional: Awake, alert, cooperative, no apparent distress   Respiratory: Clear to auscultation bilaterally, no crackles or wheezing   Cardiovascular: Regular rate and rhythm, normal S1 and S2, and no murmur noted   Abdomen: Normal bowel sounds, soft, right CVA tenderness    Skin: No rashes, no cyanosis, dry to touch   Neuro: Alert and oriented x3, no weakness, numbness, memory loss   Extremities: Trace edema, normal range of " motion   Other(s):        All other systems: Negative          Medications:      All current medications were reviewed with changes reflected in problem list.         Data:        All laboratory and imaging data in the past 24 hours reviewed       Recent Labs  Lab 17  1158 17  0708 17  2100   WBC 8.9  --   --    HGB 11.6* 11.0* 12.7   HCT 33.6*  --   --    MCV 96  --   --      --   --      No results for input(s): CULT in the last 168 hours.    Recent Labs  Lab 17  1326 17  0656 17  1158    143 142   POTASSIUM  --  4.2 4.2   CHLORIDE  --  112* 109   CO2  --  22 24   ANIONGAP  --  9 9   GLC  --  76 87   BUN  --  35* 27   CR 1.95* 1.94* 2.01*   GFRESTIMATED 31* 32* 30*   GFRESTBLACK 38* 38* 37*   ARIANA  --  8.2* 7.8*   PROTTOTAL  --   --  6.0*   ALBUMIN  --   --  2.3*   BILITOTAL  --   --  0.4   ALKPHOS  --   --  139   AST  --   --  30   ALT  --   --  22         Recent Labs  Lab 17  0656 17  1158   GLC 76 87           No results for input(s): INR in the last 168 hours.        No results for input(s): TROPONIN, TROPI, TROPR in the last 168 hours.    Invalid input(s): TROP, TROPONINIES    Recent Results (from the past 48 hour(s))   Abd/pelvis CT no contrast - Stone Protocol    Narrative    CT ABDOMEN AND PELVIS WITHOUT CONTRAST 2017 1:09 PM     HISTORY: Right flank/side pain, recent , elevated creatinine.    COMPARISON: None.    TECHNIQUE: Volumetric helical acquisition of CT images from the lung  bases through the symphysis pubis without intravenous contrast.  Radiation dose for this scan was reduced using automated exposure  control, adjustment of the mA and/or kV according to patient size, or  iterative reconstruction technique.    FINDINGS: Enlarged uterus compatible with recent . No  definite hydronephrosis. Trace free fluid. Scattered areas of  predominantly subcutaneous air compatible with recent surgery. The  liver, spleen,  adrenal glands, kidneys, and pancreas demonstrate no  worrisome focal lesion in the absence of intravenous contrast. There  are no dilated loops of small intestine or large bowel to suggest  ileus or obstruction. Normal caliber aorta. Trace bilateral pleural  fluid. No pericardial effusion. No acute infiltrates at the lung  bases. No frankly destructive bony lesions.      Impression    IMPRESSION: Postsurgical changes of  section, no acute process  or complication demonstrated.     ISMA ARAGON MD   US Renal Complete    Narrative    ULTRASOUND RENAL COMPLETE   2017 11:52 AM     HISTORY: Acute renal failure.    COMPARISON: CT of the abdomen and pelvis without IV contrast performed  2017.    FINDINGS:   Right kidney measures 13.5 x 7.2 x 5.5 cm. Cortical thickness measures  1.6 cm AP. There is no hydronephrosis. No renal calculi or solid renal  masses are evident. A small amount of free fluid is noted along the  inferior aspect of the right hepatic lobe.    Left kidney measures 13 x 6.4 x 4.9 cm. Cortical thickness measures  1.3 cm AP. There is no hydronephrosis. No renal calculi or solid renal  masses are evident.     Limited images of the bladder are unremarkable.       Impression    IMPRESSION:   1. Unremarkable renal ultrasound examination.   2. A small amount of free fluid is noted along the inferior aspect of  the right hepatic lobe, also present on the recent noncontrast CT of  the abdomen and pelvis.    ERLINDA DIXON MD                 Disclaimer: This note consists of symbols derived from keyboarding, dictation and/or voice recognition software. As a result, there may be errors in the script that have gone undetected. Please consider this when interpreting information found in this chart

## 2017-06-15 ENCOUNTER — APPOINTMENT (OUTPATIENT)
Dept: ULTRASOUND IMAGING | Facility: CLINIC | Age: 27
End: 2017-06-15
Attending: INTERNAL MEDICINE
Payer: COMMERCIAL

## 2017-06-15 VITALS
WEIGHT: 177.5 LBS | OXYGEN SATURATION: 98 % | BODY MASS INDEX: 30.3 KG/M2 | RESPIRATION RATE: 18 BRPM | SYSTOLIC BLOOD PRESSURE: 156 MMHG | HEIGHT: 64 IN | HEART RATE: 97 BPM | TEMPERATURE: 99.1 F | DIASTOLIC BLOOD PRESSURE: 87 MMHG

## 2017-06-15 LAB
ANION GAP SERPL CALCULATED.3IONS-SCNC: 10 MMOL/L (ref 3–14)
BUN SERPL-MCNC: 37 MG/DL (ref 7–30)
CALCIUM SERPL-MCNC: 8.3 MG/DL (ref 8.5–10.1)
CHLORIDE SERPL-SCNC: 111 MMOL/L (ref 94–109)
CO2 SERPL-SCNC: 23 MMOL/L (ref 20–32)
CREAT SERPL-MCNC: 1.75 MG/DL (ref 0.52–1.04)
GFR SERPL CREATININE-BSD FRML MDRD: 36 ML/MIN/1.7M2
GLUCOSE SERPL-MCNC: 92 MG/DL (ref 70–99)
POTASSIUM SERPL-SCNC: 3.8 MMOL/L (ref 3.4–5.3)
SODIUM SERPL-SCNC: 144 MMOL/L (ref 133–144)

## 2017-06-15 PROCEDURE — 36415 COLL VENOUS BLD VENIPUNCTURE: CPT | Performed by: INTERNAL MEDICINE

## 2017-06-15 PROCEDURE — 99239 HOSP IP/OBS DSCHRG MGMT >30: CPT | Performed by: INTERNAL MEDICINE

## 2017-06-15 PROCEDURE — 93970 EXTREMITY STUDY: CPT

## 2017-06-15 PROCEDURE — 80048 BASIC METABOLIC PNL TOTAL CA: CPT | Performed by: INTERNAL MEDICINE

## 2017-06-15 PROCEDURE — 25000128 H RX IP 250 OP 636: Performed by: INTERNAL MEDICINE

## 2017-06-15 PROCEDURE — 25000132 ZZH RX MED GY IP 250 OP 250 PS 637: Performed by: PHYSICIAN ASSISTANT

## 2017-06-15 RX ORDER — FUROSEMIDE 10 MG/ML
40 INJECTION INTRAMUSCULAR; INTRAVENOUS ONCE
Status: COMPLETED | OUTPATIENT
Start: 2017-06-15 | End: 2017-06-15

## 2017-06-15 RX ADMIN — ACETAMINOPHEN 650 MG: 325 TABLET, FILM COATED ORAL at 03:04

## 2017-06-15 RX ADMIN — PRENATAL VIT W/ FE FUMARATE-FA TAB 27-0.8 MG 1 TABLET: 27-0.8 TAB at 08:53

## 2017-06-15 RX ADMIN — SENNOSIDES AND DOCUSATE SODIUM 1 TABLET: 8.6; 5 TABLET ORAL at 08:53

## 2017-06-15 RX ADMIN — FUROSEMIDE 40 MG: 10 INJECTION, SOLUTION INTRAVENOUS at 11:53

## 2017-06-15 ASSESSMENT — PAIN DESCRIPTION - DESCRIPTORS: DESCRIPTORS: HEADACHE

## 2017-06-15 NOTE — PLAN OF CARE
Problem: Individualization  Goal: Patient Preferences  Outcome: Improving  Pt has done well this shift.  Up independently in room, continues to have pain in right flank area.  Pt voiding without difficulty.  Pt took shower this shift with assist of spouse.  Pt encouraged to take oral fluids, iv changed to SL.  VSS, afebrile.  Pt continues to have 2-3+ bilateral lower extremity edema.  Will continue to monitor lab values.

## 2017-06-15 NOTE — PLAN OF CARE
Pt cleared for discharge. DC instructions and follow up care reviewed with patient and .  Instructed to follow up with PCP with [facility] for follow up labs.  Pt instructed to pump and dump until 1130pm tonight due to lasix administration. Pt and  both verbalize understanding, denies any questions or concerns.  Pt ambulated with  at discharge.

## 2017-06-15 NOTE — PLAN OF CARE
Problem: Goal Outcome Summary  Goal: Goal Outcome Summary  Outcome: Improving  VSS, afebrile.  98% on RA.  AAOx4.  Up independent,   at bedside overnight.  Reports headache pain, PRN tylenol given x1.  PIV SL, encouraging PO fluids.  LS- clear.  Voiding well.  Positive bowel sounds.  Showered in the evening.   incision CDI.  Continue to monitor.

## 2017-06-15 NOTE — PROGRESS NOTES
Non-oliguric ARF improving.  Assume ATN, which should fully reverse.  Pt should avoid NSAID's until renal function is back to normal.  Agree with plan for discharge today.  Out-pt Nephrology care should not be necessary.  Recommend repeat chemistries in 4-7 days at primary care clinic.  Please call with questions.  Our group will sign off.    Sony Lindsey MD  Nephrology; [employer]  [phone]

## 2017-06-16 ENCOUNTER — TELEPHONE (OUTPATIENT)
Dept: OBGYN | Facility: CLINIC | Age: 27
End: 2017-06-16

## 2017-06-16 NOTE — DISCHARGE SUMMARY
PRIMARY CARE PHYSICIAN:  Through the [facility] in [place].      DATE OF ADMISSION:  2017.       DATE OF DISCHARGE:  06/15/2017.       DISCHARGE DIAGNOSES:   1.  Presented with right flank pain, workup negative for kidney stone or infection.   2.  Acute kidney injury with admission creatinine of 2.1.   3.  Elevated FENA consistent with acute tubular necrosis.   4.  Injury likely secondary to the use of Toradol and Motrin.   5.  Recent  2017 for arrest of dilatation and polyhydramnios uncomplicated.        HOSPITAL COURSE:  Leonor Reynoso is a 26-year-old [place] immigrant here in this country for 2 years and who had a recent  1 week prior and who presented to the emergency room with persistent right flank pain.  Workup was done and there was no kidney stones, nor infection; however, labs revealed a creatinine of 2 and she was admitted for further evaluation.  A renal ultrasound showed normal kidneys.  Her FENA came back at 6.2, consistent with acute tubular necrosis.  With time and IV hydration, her creatinine improved to 1.75.  She was also seen in consultation by Nephrology.  On review of the records, the patient had been given Toradol q.6h. IV after her  and then Motrin 800 mg q.8h.  It is most likely that the nonsteroidals caused vasoconstriction to the kidneys leading to the transient acute kidney injury, and this is expected to recover completely.  This information was communicated to her through an  and also explained to her .      PHYSICAL EXAMINATION AT TIME OF DISCHARGE:   IN GENERAL:  Interactive and up in the chair, wanting to go home to be with her infant son.  She was able to walk the full length of the hallway, but did so at a slow pace and complained of fatigue, but no shortness of breath.  She was alert, oriented and interactive, no focal weakness.   HEENT:  Exam was normal.  Membranes moist.    NECK:  Jugular venous pressure  is normal.  Carotid upstroke and volume are normal.   VITAL SIGNS:  Blood pressure 140/80 with a heart rate of 56, no significant fever, respirations 16.   LUNGS:  Clear throughout without wheezing, rhonchi or rales.   HEART:  Sounds were normal without significant murmur.   ABDOMEN:  Still protrude status post recent delivery, uterus was firm.  She had normal post-delivery lochia.   EXTREMITIES:  Her lower extremities, but she did have pedal and anterior foot edema and it was much greater on the right, so an ultrasound was done to rule out DVT and there was none.  She was given 1 dose of Lasix 40 mg IV.   SKIN:  Without lesions or rashes.   MUSCULOSKELETAL:  No joint inflammation.   GENITOURINARY:  Voiding without difficulty.     GASTROINTESTINAL:  She was eating well and did have a bowel movement.   PSYCHOLOGICAL:  She understood the treatment plan.      LABORATORY AT THE TIME OF DISCHARGE:  Sodium 144, potassium 3.8, creatinine is 1.75, bicarbonate 23.  FENA was 6.2, glucose 92, hemoglobin 11.6, white count 8.9 and platelets are 223.  CT scan showed postsurgical changes of the , but no acute process or complication.      DISCHARGE INSTRUCTIONS:   1.  You were admitted to the hospital for investigation of persistent right flank pain; your workup did not show any problems with a kidney stone or infection.   2.  However, the lab results showed that your kidney function had declined, most likely from the effects of the Toradol and Motrin medications used to manage the discomfort from your recent .   3.  In some people these medications can affect blood flow to the kidney and can cause acute kidney injury.   4.  This is improving and full recovery is expected.   5.  Do not take these medications again and this includes Motrin, Aleve, Naprosyn and Advil.     6.  Activity on discharge as before and as tolerated.  Follow a regular diet, drink 6-8 glasses of liquids a day.  Follow up with OB as arranged  and get a metabolic profile checked in 1-2 weeks at your primary and have them up the results.      DISCHARGE MEDICATIONS:   1.  Tylenol 650 every 4 hours as needed for discomfort.   2.  Oxycodone 5 mg every 4 hours if needed for severe pain, maximum of 8 tablets per day.   3.  Prenatal vitamins 1 daily.     Do not take Motrin 600, this has been discontinued.        PENDING TESTS:  No pending tests.      Greater than 30 minutes was spent on discharge preparation and instruction.         MERCY BUENO MD             D: 06/15/2017 19:33   T: 06/15/2017 20:17   MT: [id number]      Name:     OTIS CUENCA   MRN:      [id number]        Account:        [id number]   :      1990           Admit Date:                                       Discharge Date: 06/15/2017      Document: [id number]       [facility]

## 2018-07-04 ENCOUNTER — HOSPITAL ENCOUNTER (EMERGENCY)
Facility: CLINIC | Age: 28
Discharge: HOME | End: 2018-07-05
Admitting: NURSE PRACTITIONER
Payer: COMMERCIAL

## 2018-07-04 VITALS
SYSTOLIC BLOOD PRESSURE: 110 MMHG | TEMPERATURE: 98 F | DIASTOLIC BLOOD PRESSURE: 75 MMHG | RESPIRATION RATE: 16 BRPM | OXYGEN SATURATION: 99 %

## 2018-07-04 PROCEDURE — 25000125 ZZHC RX 250: Performed by: EMERGENCY MEDICINE

## 2018-07-04 PROCEDURE — 40000268 ZZH STATISTIC NO CHARGES

## 2018-07-04 RX ORDER — ONDANSETRON 4 MG/1
4 TABLET, ORALLY DISINTEGRATING ORAL ONCE
Status: COMPLETED | OUTPATIENT
Start: 2018-07-04 | End: 2018-07-04

## 2018-07-04 RX ADMIN — ONDANSETRON 4 MG: 4 TABLET, ORALLY DISINTEGRATING ORAL at 23:53

## (undated) DEVICE — Device

## (undated) DEVICE — DRSG ABDOMINAL 07 1/2X8" 7197D

## (undated) DEVICE — LINEN FULL SHEET 5511

## (undated) DEVICE — GLOVE PROTEXIS BLUE W/NEU-THERA 7.5  2D73EB75

## (undated) DEVICE — SUCTION CANISTER MEDIVAC LINER 3000ML W/LID 65651-530

## (undated) DEVICE — TRANSFER DEVICE BLOOD NDL HOLDER 364880

## (undated) DEVICE — SU PLAIN 2-0 CT-1 27" 843H

## (undated) DEVICE — CAP BABY PINK/BLUE IC-2

## (undated) DEVICE — GLOVE PROTEXIS W/NEU-THERA 7.5  2D73TE75

## (undated) DEVICE — LINEN TOWEL PACK X10 5473

## (undated) DEVICE — BAG CLEAR TRASH 1.3M 39X33" P4040C

## (undated) DEVICE — STOCKING SLEEVE VASOPRESS COMPRESSION CALF MED 18" VP501M

## (undated) DEVICE — LINEN BABY BLANKET 5434

## (undated) DEVICE — PREP CHLORAPREP 26ML TINTED ORANGE  260815

## (undated) DEVICE — SOL WATER IRRIG 1000ML BOTTLE 2F7114

## (undated) DEVICE — PAD CHUX UNDERPAD 30X36" P3036C

## (undated) DEVICE — BLADE CLIPPER SGL USE 9680

## (undated) DEVICE — DRSG TELFA ISLAND 4X10"

## (undated) DEVICE — SU VICRYL 0 CT-1 36" J346H

## (undated) DEVICE — ESU GROUND PAD ADULT W/CORD E7507

## (undated) DEVICE — LINEN DRAPE 54X72" 5467

## (undated) DEVICE — GLOVE PROTEXIS BLUE W/NEU-THERA 8.0  2D73EB80

## (undated) DEVICE — LINEN HALF SHEET 5512

## (undated) DEVICE — SUCTION CANISTER STRAW 65652-008

## (undated) DEVICE — PACK C-SECTION LF PL15OTA83B

## (undated) DEVICE — SOL NACL 0.9% IRRIG 1000ML BOTTLE 2F7124

## (undated) DEVICE — CATH TRAY FOLEY SURESTEP 16FR DRAIN BAG STATOCK A899916

## (undated) DEVICE — SU MONOCRYL 0 CT-1 36" UND Y946H

## (undated) RX ORDER — OXYTOCIN/0.9 % SODIUM CHLORIDE 30/500 ML
PLASTIC BAG, INJECTION (ML) INTRAVENOUS
Status: DISPENSED
Start: 2017-06-08

## (undated) RX ORDER — MORPHINE SULFATE 1 MG/ML
INJECTION, SOLUTION EPIDURAL; INTRATHECAL; INTRAVENOUS
Status: DISPENSED
Start: 2017-06-08

## (undated) RX ORDER — FENTANYL CITRATE 50 UG/ML
INJECTION, SOLUTION INTRAMUSCULAR; INTRAVENOUS
Status: DISPENSED
Start: 2017-06-08